# Patient Record
Sex: FEMALE | Race: WHITE | HISPANIC OR LATINO | Employment: FULL TIME | URBAN - METROPOLITAN AREA
[De-identification: names, ages, dates, MRNs, and addresses within clinical notes are randomized per-mention and may not be internally consistent; named-entity substitution may affect disease eponyms.]

---

## 2017-02-18 ENCOUNTER — HOSPITAL ENCOUNTER (OUTPATIENT)
Dept: LAB | Facility: MEDICAL CENTER | Age: 47
End: 2017-02-18
Attending: INTERNAL MEDICINE
Payer: COMMERCIAL

## 2017-02-18 LAB
25(OH)D3 SERPL-MCNC: 24 NG/ML (ref 30–100)
T4 FREE SERPL-MCNC: 0.93 NG/DL (ref 0.53–1.43)
TSH SERPL DL<=0.005 MIU/L-ACNC: 0.63 UIU/ML (ref 0.3–3.7)
VIT B12 SERPL-MCNC: 419 PG/ML (ref 211–911)

## 2017-02-18 PROCEDURE — 36415 COLL VENOUS BLD VENIPUNCTURE: CPT

## 2017-02-18 PROCEDURE — 82306 VITAMIN D 25 HYDROXY: CPT

## 2017-02-18 PROCEDURE — 84439 ASSAY OF FREE THYROXINE: CPT

## 2017-02-18 PROCEDURE — 82607 VITAMIN B-12: CPT

## 2017-02-18 PROCEDURE — 84443 ASSAY THYROID STIM HORMONE: CPT

## 2017-03-24 ENCOUNTER — OFFICE VISIT (OUTPATIENT)
Dept: URGENT CARE | Facility: PHYSICIAN GROUP | Age: 47
End: 2017-03-24
Payer: COMMERCIAL

## 2017-03-24 VITALS
SYSTOLIC BLOOD PRESSURE: 120 MMHG | BODY MASS INDEX: 23.55 KG/M2 | HEIGHT: 62 IN | WEIGHT: 128 LBS | HEART RATE: 74 BPM | TEMPERATURE: 97.3 F | RESPIRATION RATE: 12 BRPM | OXYGEN SATURATION: 99 % | DIASTOLIC BLOOD PRESSURE: 60 MMHG

## 2017-03-24 DIAGNOSIS — K12.30 STOMATITIS AND MUCOSITIS: ICD-10-CM

## 2017-03-24 DIAGNOSIS — B34.9 ACUTE BRONCHOSPASM DUE TO VIRAL INFECTION: ICD-10-CM

## 2017-03-24 DIAGNOSIS — K12.1 STOMATITIS AND MUCOSITIS: ICD-10-CM

## 2017-03-24 DIAGNOSIS — J98.01 ACUTE BRONCHOSPASM DUE TO VIRAL INFECTION: ICD-10-CM

## 2017-03-24 DIAGNOSIS — J06.9 VIRAL UPPER RESPIRATORY TRACT INFECTION: ICD-10-CM

## 2017-03-24 PROCEDURE — 99204 OFFICE O/P NEW MOD 45 MIN: CPT | Performed by: EMERGENCY MEDICINE

## 2017-03-24 ASSESSMENT — ENCOUNTER SYMPTOMS
DIARRHEA: 0
SORE THROAT: 0
RHINORRHEA: 1
HEADACHES: 0
HEARTBURN: 0
WHEEZING: 1
SPUTUM PRODUCTION: 1
SINUS PAIN: 1
ABDOMINAL PAIN: 0
CHILLS: 1
VOMITING: 0
COUGH: 1
FEVER: 0
NAUSEA: 0
EYE REDNESS: 0
HEMOPTYSIS: 0
MYALGIAS: 0
EYE DISCHARGE: 0
SHORTNESS OF BREATH: 0

## 2017-03-24 NOTE — MR AVS SNAPSHOT
"        Mirtha Han   3/24/2017 8:35 AM   Office Visit   MRN: [id number]    Department:  [facility]   Dept Phone:  [phone]    Description:  Female : 1970   Provider:  Geovany Waldron M.D.           Reason for Visit     Cough productive flem, headache, both ears, blissters mouth, x3 weeks      Allergies as of 3/24/2017     Allergen Noted Reactions    Nkda [No Known Drug Allergy] 2010         You were diagnosed with     Stomatitis and mucositis   [906819]       Acute bronchospasm due to viral infection   [971899]       Viral upper respiratory tract infection   [504574]         Vital Signs     Blood Pressure Pulse Temperature Respirations Height Weight    120/60 mmHg 74 36.3 °C (97.3 °F) 12 1.575 m (5' 2\") 58.06 kg (128 lb)    Body Mass Index Oxygen Saturation Smoking Status             23.41 kg/m2 99% Never Smoker          Basic Information     Date Of Birth Sex Race Ethnicity Preferred Language    1970 Female  or   Origin (Malay,Citizen of [place], etc) English      Problem List              ICD-10-CM Priority Class Noted - Resolved    Migraine headache G43.909   Unknown - Present    Deviated nasal septum J34.2   3/21/2016 - Present      Health Maintenance        Date Due Completion Dates    IMM DTaP/Tdap/Td Vaccine (1 - Tdap) 10/14/1991 ---    PAP SMEAR 10/14/1993 ---    MAMMOGRAM 2014 (N/S)    Override on 2013: (N/S) ([facility])    IMM INFLUENZA (1) 2016 10/30/2013            Current Immunizations     Influenza TIV (IM) 10/30/2013      Below and/or attached are the medications your provider expects you to take. Review all of your home medications and newly ordered medications with your provider and/or pharmacist. Follow medication instructions as directed by your provider and/or pharmacist. Please keep your medication list with you and share with your provider. Update the information when medications are discontinued, doses are " changed, or new medications (including over-the-counter products) are added; and carry medication information at all times in the event of emergency situations     Allergies:  NKDA - (reactions not documented)               Medications  Valid as of: March 24, 2017 - 11:29 AM    Generic Name Brand Name Tablet Size Instructions for use    Albuterol Sulfate (AEROSOL POWDER, BREATH ACTIVATED) Albuterol Sulfate 108 (90 BASE) MCG/ACT Inhale 1-2 Puffs by mouth every 6 hours as needed (coughing, wheezing).        Amoxicillin (Recon Susp) AMOXIL 400 MG/5ML Take 10 mL by mouth 2 times a day.        Hydrocodone-Acetaminophen (Solution) HYCET 7.5-325 MG/15ML Take 15-30 mL by mouth 4 times a day as needed.        Lidocaine HCl (Solution) XYLOCAINE 2 % Take 15 mL by mouth 4 times a day as needed for Throat/Mouth Pain.        Ondansetron (TABLET DISPERSIBLE) ZOFRAN ODT 4 MG Take 1 Tab by mouth every 6 hours as needed for Nausea/Vomiting.        Propranolol HCl   by Does not apply route. 2 x day for migraines        Sumatriptan-Naproxen Sodium (Tab) Sumatriptan-Naproxen Sodium  MG Take  by mouth. Prn migraines        Topiramate (Tab) TOPAMAX 25 MG Take 25 mg by mouth as needed.        .                 Medicines prescribed today were sent to:     [facility] [address], [place] - [address]    [address] [address]    Phone: [phone] Fax: [phone]    Open 24 Hours?: No    [facility] [id number] - [place], [place] - [address]    [address] [address]    Phone: [phone] Fax: [phone]    Open 24 Hours?: No      Medication refill instructions:       If your prescription bottle indicates you have medication refills left, it is not necessary to call your provider’s office. Please contact your pharmacy and they will refill your medication.    If your prescription bottle indicates you do not have any refills left, you may request refills at any time through one of the  following ways: The online [employer] system (except Urgent Care), by calling your provider’s office, or by asking your pharmacy to contact your provider’s office with a refill request. Medication refills are processed only during regular business hours and may not be available until the next business day. Your provider may request additional information or to have a follow-up visit with you prior to refilling your medication.   *Please Note: Medication refills are assigned a new Rx number when refilled electronically. Your pharmacy may indicate that no refills were authorized even though a new prescription for the same medication is available at the pharmacy. Please request the medicine by name with the pharmacy before contacting your provider for a refill.        Instructions    Use saline nasal irrigation, such as with a Neti Pot, as needed daily for relief of nasal or sinus congestion relief.  Use behind-the-counter pseudoephedrine (Sudafed) oral decongestant as needed; avoid bedtime use.  Use over-the-counter oxymetazoline (Afrin) nasal spray as needed for up to 3 days only; follow package instructions for dosing.  Use over-the-counter pain reliever, such as ibuprofen (Advil, Motrin) or naproxen (Aleve) as needed; follow package directions for dosing.    Stomatitis  Stomatitis is a condition that causes inflammation in your mouth. It can affect a part of your mouth or your whole mouth. The condition often affects your cheek, teeth, gums, lips, and tongue. Stomatitis can also affect the mucous membranes that surround your mouth (mucosa).  Pain from stomatitis can make it hard for you to eat or drink. Severe cases of this condition can lead to dehydration or poor nutrition.  CAUSES  Common causes of this condition include:  · Viruses, such as cold sores or oral herpes and shingles.  · Canker sores.  · Bacterial infections.  · Fungus or yeast infections, such as oral thrush.  · Not getting adequate  nutrition.  · Injury to your mouth. This can be from:  ¨ Dentures or braces that do not fit well.  ¨ Biting your tongue or cheek.  ¨ Burning your mouth.  ¨ Having sharp or broken teeth.  · Gum disease.  · Using tobacco, especially chewing tobacco.  · Allergies to foods, medicines, or substances that are used in your mouth.  · Medicines, including cancer medicines (chemotherapy), antihistamines, and seizure medicines.  In some cases, the cause may not be known.  RISK FACTORS  This condition is more likely to develop in people who:  · Have poor oral hygiene or poor nutrition.  · Have any condition that causes a dry mouth.  · Are under a lot of physical or emotional stress.  · Have any condition that weakens the body's defense system (immune system).  · Are being treated for cancer.  · Smoke.  SYMPTOMS  The most common symptoms of this condition are pain, swelling, and redness inside your mouth. The pain may feel like burning or stinging. It may get worse from eating or drinking. Other symptoms include:  · Painful, shallow sores (ulcers) in the mouth.  · Blisters in the mouth.  · Bleeding gums.  · Swollen gums.  · Irritability and fatigue.  · Bad breath.  · Bad taste in the mouth.  · Fever.  DIAGNOSIS  This condition is diagnosed with a physical exam to check for bleeding gums and mouth ulcers. You may also have other tests, including:  · Blood tests to look for infection or vitamin deficiencies.  · Mouth swab to get a fluid sample to test for bacteria (culture).  · Tissue sample from an ulcer to examine under a microscope (biopsy).  TREATMENT  Treatment for stomatitis depends on the cause. Treatment may include medicines, such as:  · Over-the counter (OTC) pain medicines.  · Topical anesthetic to numb the area if you have severe pain.  · Antibiotics to treat a bacterial infection.  · Antifungals to treat a fungal infection.  · Antivirals to treat a viral infection.  · Mouth rinses that contain steroids to reduce the  swelling in your mouth.  · Other medicines to coat or numb your mouth.  HOME CARE INSTRUCTIONS  Medicines  · Take medicines only as directed by your health care provider.  · If you were prescribed an antibiotic, finish all of it even if you start to feel better.  Lifestyle  · Practice good oral hygiene:  ¨ Gently brush your teeth with a soft, nylon-bristled toothbrush two times each day.  ¨ Floss your teeth every day.  ¨ Have your teeth cleaned regularly, as recommended by your dentist.  · Eat a balanced diet. Do not eat:  ¨ Spicy foods.  ¨ Citrus, such as oranges.  ¨ Foods that have sharp edges, such as chips.  · Avoid any foods or other allergens that you think may be causing your stomatitis.  · If you have dentures, make sure that they are properly fitted.  · Do not use any tobacco products, including cigarettes, chewing tobacco, or electronic cigarettes. If you need help quitting, ask your health care provider.  · Find ways to reduce stress. Try yoga or meditation. Ask your health care provider for other ideas.  General Instructions  · Use a salt-water rinse for pain as directed by your health care provider. Mix 1 tsp of salt in 2 cups of water.  · Drink enough fluid to keep your urine clear or pale yellow. This will keep you hydrated.  SEEK MEDICAL CARE IF:  · Your symptoms get worse.  · You develop new symptoms, especially:  ¨ A rash.  ¨ New symptoms that do not involve your mouth area.  · Your symptoms last longer than three weeks.  · Your stomatitis goes away and then returns.  · You have a harder time eating and drinking normally.  · You have increasing fatigue or weakness.  · You lose your appetite or you feel nauseous.  · You have a fever.     This information is not intended to replace advice given to you by your health care provider. Make sure you discuss any questions you have with your health care provider.     Document Released: 10/14/2008 Document Revised: 05/03/2016 Document Reviewed:  12/14/2015  [employer] Interactive Patient Education ©2016 [employer]  Bronchospasm, Adult  A bronchospasm is when the tubes that carry air in and out of your lungs (airways) spasm or tighten. During a bronchospasm it is hard to breathe. This is because the airways get smaller. A bronchospasm can be triggered by:  · Allergies. These may be to animals, pollen, food, or mold.  · Infection. This is a common cause of bronchospasm.  · Exercise.  · Irritants. These include pollution, cigarette smoke, strong odors, aerosol sprays, and paint fumes.  · Weather changes.  · Stress.  · Being emotional.  HOME CARE   · Always have a plan for getting help. Know when to call your doctor and local emergency services (911 in the [place] Know where you can get emergency care.  · Only take medicines as told by your doctor.  · If you were prescribed an inhaler or nebulizer machine, ask your doctor how to use it correctly. Always use a spacer with your inhaler if you were given one.  · Stay calm during an attack. Try to relax and breathe more slowly.  · Control your home environment:  · Change your heating and air conditioning filter at least once a month.  · Limit your use of fireplaces and wood stoves.  · Do not  smoke. Do not  allow smoking in your home.  · Avoid perfumes and fragrances.  · Get rid of pests (such as roaches and mice) and their droppings.  · Throw away plants if you see mold on them.  · Keep your house clean and dust free.  · Replace carpet with wood, tile, or vinyl guillaume. Carpet can trap dander and dust.  · Use allergy-proof pillows, mattress covers, and box spring covers.  · Wash bed sheets and blankets every week in hot water. Dry them in a dryer.  · Use blankets that are made of polyester or cotton.  · Wash hands frequently.  GET HELP IF:  · You have muscle aches.  · You have chest pain.  · The thick spit you spit or cough up (sputum) changes from clear or white to yellow, green, gray, or bloody.  · The thick  spit you spit or cough up gets thicker.  · There are problems that may be related to the medicine you are given such as:  · A rash.  · Itching.  · Swelling.  · Trouble breathing.  GET HELP RIGHT AWAY IF:  · You feel you cannot breathe or catch your breath.  · You cannot stop coughing.  · Your treatment is not helping you breathe better.  · You have very bad chest pain.  MAKE SURE YOU:   · Understand these instructions.  · Will watch your condition.  · Will get help right away if you are not doing well or get worse.     This information is not intended to replace advice given to you by your health care provider. Make sure you discuss any questions you have with your health care provider.     Document Released: 10/15/2010 Document Revised: 01/08/2016 Document Reviewed: 06/10/2014  [employer] Interactive Patient Education ©2016 [employer]            MyChart Status: Patient Declined

## 2017-03-24 NOTE — PROGRESS NOTES
Subjective:      Mirtha Han is a 46 y.o. female who presents with Cough            Oral Pain  This is a recurrent problem. The current episode started yesterday. The problem occurs constantly. The problem has been unchanged. Associated symptoms include chills, congestion and coughing. Pertinent negatives include no abdominal pain, chest pain, fever, headaches, myalgias, nausea, rash, sore throat or vomiting. The symptoms are aggravated by eating.   URI   This is a new problem. Episode onset: over 2 weeks ago. The problem has been gradually improving. There has been no fever. Associated symptoms include congestion, coughing, ear pain, rhinorrhea, sinus pain and wheezing. Pertinent negatives include no abdominal pain, chest pain, diarrhea, headaches, nausea, plugged ear sensation, rash, sneezing, sore throat or vomiting.       Review of Systems   Constitutional: Positive for chills. Negative for fever and malaise/fatigue.   HENT: Positive for congestion, ear pain and rhinorrhea. Negative for ear discharge, hearing loss, nosebleeds, sneezing, sore throat and tinnitus.    Eyes: Negative for discharge and redness.   Respiratory: Positive for cough, sputum production and wheezing. Negative for hemoptysis and shortness of breath.    Cardiovascular: Negative for chest pain.   Gastrointestinal: Negative for heartburn, nausea, vomiting, abdominal pain and diarrhea.   Musculoskeletal: Negative for myalgias.   Skin: Negative for itching and rash.   Neurological: Negative for headaches.     PMH:  has a past medical history of Migraine headache; Pap smear; Stable burst fracture of first lumbar vertebra ([facility]) (5/01); Snoring; and Sleep apnea.  MEDS:   Current outpatient prescriptions:   •  Albuterol Sulfate 108 (90 BASE) MCG/ACT AEROSOL POWDER, BREATH ACTIVATED, Inhale 1-2 Puffs by mouth every 6 hours as needed (coughing, wheezing)., Disp: 1 Each, Rfl: 0  •  lidocaine viscous 2% (XYLOCAINE) 2 % Solution, Take 15 mL  by mouth 4 times a day as needed for Throat/Mouth Pain., Disp: 120 mL, Rfl: 0  •  topiramate (TOPAMAX) 25 MG Tab, Take 25 mg by mouth as needed., Disp: , Rfl:   •  PROPRANOLOL HCL, by Does not apply route. 2 x day for migraines, Disp: , Rfl:   •  amoxicillin (AMOXIL) 400 MG/5ML suspension, Take 10 mL by mouth 2 times a day., Disp: 200 mL, Rfl: 0  •  hydrocodone-acetaminophen 2.5-108 mg/5mL (HYCET) 7.5-325 MG/15ML solution, Take 15-30 mL by mouth 4 times a day as needed., Disp: 500 mL, Rfl: 0  •  ondansetron (ZOFRAN ODT) 4 MG TABLET DISPERSIBLE, Take 1 Tab by mouth every 6 hours as needed for Nausea/Vomiting., Disp: 10 Tab, Rfl: 0  •  Sumatriptan-Naproxen Sodium (TREXIMET)  MG TABS, Take  by mouth. Prn migraines, Disp: , Rfl:   ALLERGIES:   Allergies   Allergen Reactions   • Nkda [No Known Drug Allergy]      SURGHX:   Past Surgical History   Procedure Laterality Date   • Other orthopedic surgery  1989     right foot surgery - bone removed   • Tubal coagulation laparoscopic bilateral  6/9/2010     Performed by AGAPITO SUÁREZ at SURGERY SAME DAY [facility]   • Septoplasty N/A 3/21/2016     Procedure: SEPTOPLASTY;  Surgeon: DELMA Rodriguez M.D.;  Location: SURGERY SAME DAY [facility];  Service:    • Tonsillectomy N/A 3/21/2016     Procedure: TONSILLECTOMY;  Surgeon: DELMA Rodriguez M.D.;  Location: SURGERY SAME DAY [facility];  Service:    • Septal reconstruction N/A 3/21/2016     Procedure: SEPTAL RECONSTRUCTION with  grafts ;  Surgeon: DELMA Rodriguez M.D.;  Location: SURGERY SAME DAY [facility];  Service:    • Turbinoplasty N/A 3/21/2016     Procedure: TURBINOPLASTY;  Surgeon: DELMA Rodriguez M.D.;  Location: SURGERY SAME DAY [facility];  Service:      SOCHX:  reports that she has never smoked. She does not have any smokeless tobacco history on file. She reports that she drinks alcohol. She reports that she does not use illicit drugs.  FH: family history includes Diabetes in her  "father; Hypertension in her mother.       Objective:     /60 mmHg  Pulse 74  Temp(Src) 36.3 °C (97.3 °F)  Resp 12  Ht 1.575 m (5' 2\")  Wt 58.06 kg (128 lb)  BMI 23.41 kg/m2  SpO2 99%     Physical Exam   Constitutional: She appears well-developed and well-nourished. She is cooperative. She does not have a sickly appearance. She does not appear ill. No distress.   HENT:   Head: Normocephalic.   Right Ear: Hearing, tympanic membrane and ear canal normal.   Left Ear: Hearing, tympanic membrane and ear canal normal.   Nose: Mucosal edema present. No rhinorrhea.   Mouth/Throat: Uvula is midline. No oropharyngeal exudate, posterior oropharyngeal edema or posterior oropharyngeal erythema.   Mild lower buccal sulcus erythema, no clear gingivitis. Punctate erythematous lesion, hard palate. No clear vesicles. Tongue unremarkable   Eyes: Conjunctivae are normal.   Neck: Trachea normal. Neck supple.   Cardiovascular: Normal rate, regular rhythm and normal heart sounds.    Pulmonary/Chest: Effort normal. She has no decreased breath sounds. She has no wheezes. She has no rhonchi. She has no rales.   Lymphadenopathy:     She has no cervical adenopathy.   Neurological: She is alert.   Skin: Skin is warm and dry.   Psychiatric: She has a normal mood and affect.               Assessment/Plan:     1. Stomatitis and mucositis  Advised OTC NSAID  - lidocaine viscous 2% (XYLOCAINE) 2 % Solution; Take 15 mL by mouth 4 times a day as needed for Throat/Mouth Pain.  Dispense: 120 mL; Refill: 0    2. Acute bronchospasm due to viral infection  When necessary use  - Albuterol Sulfate 108 (90 BASE) MCG/ACT AEROSOL POWDER, BREATH ACTIVATED; Inhale 1-2 Puffs by mouth every 6 hours as needed (coughing, wheezing).  Dispense: 1 Each; Refill: 0    3. Viral upper respiratory tract infection  Supportive cares since symptoms resolving        "

## 2017-03-24 NOTE — PATIENT INSTRUCTIONS
Use saline nasal irrigation, such as with a Neti Pot, as needed daily for relief of nasal or sinus congestion relief.  Use behind-the-counter pseudoephedrine (Sudafed) oral decongestant as needed; avoid bedtime use.  Use over-the-counter oxymetazoline (Afrin) nasal spray as needed for up to 3 days only; follow package instructions for dosing.  Use over-the-counter pain reliever, such as ibuprofen (Advil, Motrin) or naproxen (Aleve) as needed; follow package directions for dosing.    Stomatitis  Stomatitis is a condition that causes inflammation in your mouth. It can affect a part of your mouth or your whole mouth. The condition often affects your cheek, teeth, gums, lips, and tongue. Stomatitis can also affect the mucous membranes that surround your mouth (mucosa).  Pain from stomatitis can make it hard for you to eat or drink. Severe cases of this condition can lead to dehydration or poor nutrition.  CAUSES  Common causes of this condition include:  · Viruses, such as cold sores or oral herpes and shingles.  · Canker sores.  · Bacterial infections.  · Fungus or yeast infections, such as oral thrush.  · Not getting adequate nutrition.  · Injury to your mouth. This can be from:  ¨ Dentures or braces that do not fit well.  ¨ Biting your tongue or cheek.  ¨ Burning your mouth.  ¨ Having sharp or broken teeth.  · Gum disease.  · Using tobacco, especially chewing tobacco.  · Allergies to foods, medicines, or substances that are used in your mouth.  · Medicines, including cancer medicines (chemotherapy), antihistamines, and seizure medicines.  In some cases, the cause may not be known.  RISK FACTORS  This condition is more likely to develop in people who:  · Have poor oral hygiene or poor nutrition.  · Have any condition that causes a dry mouth.  · Are under a lot of physical or emotional stress.  · Have any condition that weakens the body's defense system (immune system).  · Are being treated for  cancer.  · Smoke.  SYMPTOMS  The most common symptoms of this condition are pain, swelling, and redness inside your mouth. The pain may feel like burning or stinging. It may get worse from eating or drinking. Other symptoms include:  · Painful, shallow sores (ulcers) in the mouth.  · Blisters in the mouth.  · Bleeding gums.  · Swollen gums.  · Irritability and fatigue.  · Bad breath.  · Bad taste in the mouth.  · Fever.  DIAGNOSIS  This condition is diagnosed with a physical exam to check for bleeding gums and mouth ulcers. You may also have other tests, including:  · Blood tests to look for infection or vitamin deficiencies.  · Mouth swab to get a fluid sample to test for bacteria (culture).  · Tissue sample from an ulcer to examine under a microscope (biopsy).  TREATMENT  Treatment for stomatitis depends on the cause. Treatment may include medicines, such as:  · Over-the counter (OTC) pain medicines.  · Topical anesthetic to numb the area if you have severe pain.  · Antibiotics to treat a bacterial infection.  · Antifungals to treat a fungal infection.  · Antivirals to treat a viral infection.  · Mouth rinses that contain steroids to reduce the swelling in your mouth.  · Other medicines to coat or numb your mouth.  HOME CARE INSTRUCTIONS  Medicines  · Take medicines only as directed by your health care provider.  · If you were prescribed an antibiotic, finish all of it even if you start to feel better.  Lifestyle  · Practice good oral hygiene:  ¨ Gently brush your teeth with a soft, nylon-bristled toothbrush two times each day.  ¨ Floss your teeth every day.  ¨ Have your teeth cleaned regularly, as recommended by your dentist.  · Eat a balanced diet. Do not eat:  ¨ Spicy foods.  ¨ Citrus, such as oranges.  ¨ Foods that have sharp edges, such as chips.  · Avoid any foods or other allergens that you think may be causing your stomatitis.  · If you have dentures, make sure that they are properly fitted.  · Do not use  any tobacco products, including cigarettes, chewing tobacco, or electronic cigarettes. If you need help quitting, ask your health care provider.  · Find ways to reduce stress. Try yoga or meditation. Ask your health care provider for other ideas.  General Instructions  · Use a salt-water rinse for pain as directed by your health care provider. Mix 1 tsp of salt in 2 cups of water.  · Drink enough fluid to keep your urine clear or pale yellow. This will keep you hydrated.  SEEK MEDICAL CARE IF:  · Your symptoms get worse.  · You develop new symptoms, especially:  ¨ A rash.  ¨ New symptoms that do not involve your mouth area.  · Your symptoms last longer than three weeks.  · Your stomatitis goes away and then returns.  · You have a harder time eating and drinking normally.  · You have increasing fatigue or weakness.  · You lose your appetite or you feel nauseous.  · You have a fever.     This information is not intended to replace advice given to you by your health care provider. Make sure you discuss any questions you have with your health care provider.     Document Released: 10/14/2008 Document Revised: 05/03/2016 Document Reviewed: 12/14/2015  [employer] Interactive Patient Education ©2016 [employer]  Bronchospasm, Adult  A bronchospasm is when the tubes that carry air in and out of your lungs (airways) spasm or tighten. During a bronchospasm it is hard to breathe. This is because the airways get smaller. A bronchospasm can be triggered by:  · Allergies. These may be to animals, pollen, food, or mold.  · Infection. This is a common cause of bronchospasm.  · Exercise.  · Irritants. These include pollution, cigarette smoke, strong odors, aerosol sprays, and paint fumes.  · Weather changes.  · Stress.  · Being emotional.  HOME CARE   · Always have a plan for getting help. Know when to call your doctor and local emergency services (911 in the [place] Know where you can get emergency care.  · Only take medicines as  told by your doctor.  · If you were prescribed an inhaler or nebulizer machine, ask your doctor how to use it correctly. Always use a spacer with your inhaler if you were given one.  · Stay calm during an attack. Try to relax and breathe more slowly.  · Control your home environment:  · Change your heating and air conditioning filter at least once a month.  · Limit your use of fireplaces and wood stoves.  · Do not  smoke. Do not  allow smoking in your home.  · Avoid perfumes and fragrances.  · Get rid of pests (such as roaches and mice) and their droppings.  · Throw away plants if you see mold on them.  · Keep your house clean and dust free.  · Replace carpet with wood, tile, or vinyl guillaume. Carpet can trap dander and dust.  · Use allergy-proof pillows, mattress covers, and box spring covers.  · Wash bed sheets and blankets every week in hot water. Dry them in a dryer.  · Use blankets that are made of polyester or cotton.  · Wash hands frequently.  GET HELP IF:  · You have muscle aches.  · You have chest pain.  · The thick spit you spit or cough up (sputum) changes from clear or white to yellow, green, gray, or bloody.  · The thick spit you spit or cough up gets thicker.  · There are problems that may be related to the medicine you are given such as:  · A rash.  · Itching.  · Swelling.  · Trouble breathing.  GET HELP RIGHT AWAY IF:  · You feel you cannot breathe or catch your breath.  · You cannot stop coughing.  · Your treatment is not helping you breathe better.  · You have very bad chest pain.  MAKE SURE YOU:   · Understand these instructions.  · Will watch your condition.  · Will get help right away if you are not doing well or get worse.     This information is not intended to replace advice given to you by your health care provider. Make sure you discuss any questions you have with your health care provider.     Document Released: 10/15/2010 Document Revised: 01/08/2016 Document Reviewed:  06/10/2014  Elsevier Interactive Patient Education ©2016 [employer] Inc.

## 2017-06-27 ENCOUNTER — HOSPITAL ENCOUNTER (OUTPATIENT)
Dept: LAB | Facility: MEDICAL CENTER | Age: 47
End: 2017-06-27
Attending: INTERNAL MEDICINE
Payer: COMMERCIAL

## 2017-06-27 LAB
ALBUMIN SERPL BCP-MCNC: 4.2 G/DL (ref 3.2–4.9)
ALBUMIN/GLOB SERPL: 1.4 G/DL
ALP SERPL-CCNC: 73 U/L (ref 30–99)
ALT SERPL-CCNC: 17 U/L (ref 2–50)
ANION GAP SERPL CALC-SCNC: 7 MMOL/L (ref 0–11.9)
AST SERPL-CCNC: 18 U/L (ref 12–45)
BILIRUB SERPL-MCNC: 0.4 MG/DL (ref 0.1–1.5)
BUN SERPL-MCNC: 11 MG/DL (ref 8–22)
CALCIUM SERPL-MCNC: 9.5 MG/DL (ref 8.5–10.5)
CHLORIDE SERPL-SCNC: 106 MMOL/L (ref 96–112)
CO2 SERPL-SCNC: 27 MMOL/L (ref 20–33)
CREAT SERPL-MCNC: 0.72 MG/DL (ref 0.5–1.4)
GFR SERPL CREATININE-BSD FRML MDRD: >60 ML/MIN/1.73 M 2
GLOBULIN SER CALC-MCNC: 3 G/DL (ref 1.9–3.5)
GLUCOSE SERPL-MCNC: 103 MG/DL (ref 65–99)
POTASSIUM SERPL-SCNC: 4.6 MMOL/L (ref 3.6–5.5)
PROT SERPL-MCNC: 7.2 G/DL (ref 6–8.2)
SODIUM SERPL-SCNC: 140 MMOL/L (ref 135–145)

## 2017-06-27 PROCEDURE — 36415 COLL VENOUS BLD VENIPUNCTURE: CPT

## 2017-06-27 PROCEDURE — 80053 COMPREHEN METABOLIC PANEL: CPT

## 2017-07-10 ENCOUNTER — HOSPITAL ENCOUNTER (OUTPATIENT)
Dept: LAB | Facility: MEDICAL CENTER | Age: 47
End: 2017-07-10
Attending: INTERNAL MEDICINE
Payer: COMMERCIAL

## 2017-07-10 ENCOUNTER — HOSPITAL ENCOUNTER (OUTPATIENT)
Dept: RADIOLOGY | Facility: MEDICAL CENTER | Age: 47
End: 2017-07-10
Attending: INTERNAL MEDICINE
Payer: COMMERCIAL

## 2017-07-10 DIAGNOSIS — M25.551 RIGHT HIP PAIN: ICD-10-CM

## 2017-07-10 PROCEDURE — 73502 X-RAY EXAM HIP UNI 2-3 VIEWS: CPT | Mod: RT

## 2017-09-23 ENCOUNTER — HOSPITAL ENCOUNTER (OUTPATIENT)
Dept: RADIOLOGY | Facility: MEDICAL CENTER | Age: 47
End: 2017-09-23
Attending: ORTHOPAEDIC SURGERY
Payer: COMMERCIAL

## 2017-09-23 DIAGNOSIS — M16.11 PRIMARY OSTEOARTHRITIS OF RIGHT HIP: ICD-10-CM

## 2017-09-23 PROCEDURE — 73721 MRI JNT OF LWR EXTRE W/O DYE: CPT | Mod: RT

## 2018-01-20 ENCOUNTER — HOSPITAL ENCOUNTER (OUTPATIENT)
Dept: LAB | Facility: MEDICAL CENTER | Age: 48
End: 2018-01-20
Attending: INTERNAL MEDICINE
Payer: COMMERCIAL

## 2018-01-20 LAB
25(OH)D3 SERPL-MCNC: 49 NG/ML (ref 30–100)
ALBUMIN SERPL BCP-MCNC: 4.7 G/DL (ref 3.2–4.9)
ALBUMIN/GLOB SERPL: 1.8 G/DL
ALP SERPL-CCNC: 72 U/L (ref 30–99)
ALT SERPL-CCNC: 19 U/L (ref 2–50)
ANION GAP SERPL CALC-SCNC: 7 MMOL/L (ref 0–11.9)
AST SERPL-CCNC: 21 U/L (ref 12–45)
BILIRUB SERPL-MCNC: 0.6 MG/DL (ref 0.1–1.5)
BUN SERPL-MCNC: 17 MG/DL (ref 8–22)
CALCIUM SERPL-MCNC: 9.9 MG/DL (ref 8.5–10.5)
CHLORIDE SERPL-SCNC: 108 MMOL/L (ref 96–112)
CHOLEST SERPL-MCNC: 196 MG/DL (ref 100–199)
CO2 SERPL-SCNC: 26 MMOL/L (ref 20–33)
CREAT SERPL-MCNC: 0.73 MG/DL (ref 0.5–1.4)
GLOBULIN SER CALC-MCNC: 2.6 G/DL (ref 1.9–3.5)
GLUCOSE SERPL-MCNC: 91 MG/DL (ref 65–99)
HDLC SERPL-MCNC: 65 MG/DL
LDLC SERPL CALC-MCNC: 120 MG/DL
POTASSIUM SERPL-SCNC: 4.5 MMOL/L (ref 3.6–5.5)
PROT SERPL-MCNC: 7.3 G/DL (ref 6–8.2)
SODIUM SERPL-SCNC: 141 MMOL/L (ref 135–145)
TRIGL SERPL-MCNC: 56 MG/DL (ref 0–149)

## 2018-01-20 PROCEDURE — 82306 VITAMIN D 25 HYDROXY: CPT

## 2018-01-20 PROCEDURE — 36415 COLL VENOUS BLD VENIPUNCTURE: CPT

## 2018-01-20 PROCEDURE — 80061 LIPID PANEL: CPT

## 2018-01-20 PROCEDURE — 80053 COMPREHEN METABOLIC PANEL: CPT

## 2018-03-21 ENCOUNTER — HOSPITAL ENCOUNTER (EMERGENCY)
Facility: MEDICAL CENTER | Age: 48
End: 2018-03-21
Attending: EMERGENCY MEDICINE
Payer: COMMERCIAL

## 2018-03-21 VITALS
DIASTOLIC BLOOD PRESSURE: 58 MMHG | BODY MASS INDEX: 20.9 KG/M2 | HEIGHT: 66 IN | HEART RATE: 74 BPM | RESPIRATION RATE: 16 BRPM | OXYGEN SATURATION: 97 % | SYSTOLIC BLOOD PRESSURE: 90 MMHG | WEIGHT: 130.07 LBS

## 2018-03-21 DIAGNOSIS — F10.10 ETOH ABUSE: ICD-10-CM

## 2018-03-21 LAB
AMPHET UR QL SCN: NEGATIVE
BARBITURATES UR QL SCN: NEGATIVE
BENZODIAZ UR QL SCN: NEGATIVE
BZE UR QL SCN: NEGATIVE
CANNABINOIDS UR QL SCN: NEGATIVE
METHADONE UR QL SCN: NEGATIVE
OPIATES UR QL SCN: NEGATIVE
OXYCODONE UR QL SCN: NEGATIVE
PCP UR QL SCN: NEGATIVE
POC BREATHALIZER: 0.19 PERCENT (ref 0–0.01)
PROPOXYPH UR QL SCN: NEGATIVE

## 2018-03-21 PROCEDURE — 99285 EMERGENCY DEPT VISIT HI MDM: CPT

## 2018-03-21 PROCEDURE — 302970 POC BREATHALIZER: Performed by: EMERGENCY MEDICINE

## 2018-03-21 PROCEDURE — 80307 DRUG TEST PRSMV CHEM ANLYZR: CPT

## 2018-03-21 PROCEDURE — 302970 POC BREATHALIZER

## 2018-03-21 ASSESSMENT — PAIN SCALES - GENERAL: PAINLEVEL_OUTOF10: 0

## 2018-03-21 ASSESSMENT — LIFESTYLE VARIABLES: DO YOU DRINK ALCOHOL: NO

## 2018-03-22 NOTE — ED NOTES
Pt vomited approx 300 ML of food/fluid; requesting water to drink; given ice chips and instructed to eat one slowly at a time to prevent from causing further vomiting.

## 2018-03-22 NOTE — ED NOTES
Discharge instructions given to patient and family; both verbalized understanding. VSS for baseline prior to discharge. Patient seen/cleared for discharge by [facility] and ERP. Pt ambulatory w/ steady gait upon leaving ED w/ adult daughter who will drive patient home.

## 2018-03-22 NOTE — ED PROVIDER NOTES
"ED Provider Note    CHIEF COMPLAINT  Chief Complaint   Patient presents with   • Alcohol Intoxication     pt biba from home after patient drank 1 bottle of wine and became emotional. [place] Chris called to house to help calm pt down .   • Suicidal Ideation     pt stated to fire/ems that she thinks her 17 year old daughter is doing drugs which made her emotional and drink a bottle of wine tonight        HPI  Mirtha Han is a 47 y.o. female here for evaluation after she drank 1.5 bottles of red wine, and states \"i feel like a mother who has failed.'  She states this because her daughter smokes weed.  The pt herself has no medical concerns, no cp, no sob, no abdominal pain, and no headache.   She states \"i shouldn't live anymore.'   She denies any attempt in the past, and denies taking any pills today.  Admits to only etoh.  She has no reports of homicidal ideation.     PAST MEDICAL HISTORY   has a past medical history of Migraine headache; Pap smear; Sleep apnea; Snoring; and Stable burst fracture of first lumbar vertebra (CMS-HCC) (5/01).    SOCIAL HISTORY  Social History     Social History Main Topics   • Smoking status: Never Smoker   • Smokeless tobacco: Not on file   • Alcohol use Yes      Comment: 4 per month   • Drug use: No   • Sexual activity: Yes     Birth control/ protection: Pill       SURGICAL HISTORY   has a past surgical history that includes other orthopedic surgery (1989); tubal coagulation laparoscopic bilateral (6/9/2010); septoplasty (N/A, 3/21/2016); tonsillectomy (N/A, 3/21/2016); septal reconstruction (N/A, 3/21/2016); and turbinoplasty (N/A, 3/21/2016).    CURRENT MEDICATIONS  Home Medications    **Home medications have not yet been reviewed for this encounter**         ALLERGIES  Allergies   Allergen Reactions   • Nkda [No Known Drug Allergy]        REVIEW OF SYSTEMS  See HPI for further details. Review of systems as above, otherwise all other systems are negative.     PHYSICAL " "EXAM  VITAL SIGNS: /78   Pulse 83   Resp 18   Ht 1.676 m (5' 6\")   Wt 59 kg (130 lb 1.1 oz)   SpO2 98%   BMI 20.99 kg/m²     Constitutional: Well developed, well nourished. mild acute distress.  HEENT: Normocephalic, atraumatic. MMM  Neck: Supple, Full range of motion   Chest/Pulmonary:  No respiratory distress.  Equal expansion   Musculoskeletal: No deformity, no edema, neurovascular intact.   Neuro: slurred speech, cooperative, cranial nerves II-XII grossly intact.  Psych:  Tearful, depressed,      PROCEDURES     MEDICAL RECORD  I have reviewed patient's medical record and pertinent results are listed above.    COURSE & MEDICAL DECISION MAKING  I have reviewed any medical record information, laboratory studies and radiographic results as noted above.    I you have had any blood pressure issues while here in the emergency department, please see your doctor for a further evaluation or work up.    The pt will be evaluated when sober, by the oncoming erp.      Differential diagnoses include but not limited to: etoh abuse, si/hi    This patient presents with etoh abuse .  At this time, I have counseled the patient/family regarding their medications, pain control, and follow up.  They will continue their medications, if any, as prescribed.  They will return immediately for any worsening symptoms and/or any other medical concerns.  They will see their doctor, or contact the doctor provided, in 1-2 days for follow up.       FINAL IMPRESSION  etoh abuse      Electronically signed by: Kaushal Duong, 3/21/2018 7:12 PM      "

## 2018-03-22 NOTE — ED PROVIDER NOTES
ED Provider Note    The patient was reexamined. Her family is with her and she has a good support network. The patient assures me that she is not suicidal. She contracts for safety. She will follow up as per life skills.

## 2018-03-22 NOTE — DISCHARGE INSTRUCTIONS
Alcohol Problems  Most adults who drink alcohol drink in moderation (not a lot) are at low risk for developing problems related to their drinking. However, all drinkers, including low-risk drinkers, should know about the health risks connected with drinking alcohol.  RECOMMENDATIONS FOR LOW-RISK DRINKING   Drink in moderation. Moderate drinking is defined as follows:   · Men - no more than 2 drinks per day.  · Nonpregnant women - no more than 1 drink per day.  · Over age 65 - no more than 1 drink per day.  A standard drink is 12 grams of pure alcohol, which is equal to a 12 ounce bottle of beer or wine cooler, a 5 ounce glass of wine, or 1.5 ounces of distilled spirits (such as whiskey, roseanna, vodka, or rum).   ABSTAIN FROM (DO NOT DRINK) ALCOHOL:  · When pregnant or considering pregnancy.  · When taking a medication that interacts with alcohol.  · If you are alcohol dependent.  · A medical condition that prohibits drinking alcohol (such as ulcer, liver disease, or heart disease).  DISCUSS WITH YOUR CAREGIVER:  · If you are at risk for coronary heart disease, discuss the potential benefits and risks of alcohol use: Light to moderate drinking is associated with lower rates of coronary heart disease in certain populations (for example, men over age 45 and postmenopausal women). Infrequent or nondrinkers are advised not to begin light to moderate drinking to reduce the risk of coronary heart disease so as to avoid creating an alcohol-related problem. Similar protective effects can likely be gained through proper diet and exercise.  · Women and the elderly have smaller amounts of body water than men. As a result women and the elderly achieve a higher blood alcohol concentration after drinking the same amount of alcohol.  · Exposing a fetus to alcohol can cause a broad range of birth defects referred to as Fetal Alcohol Syndrome (FAS) or Alcohol-Related Birth Defects (ARBD). Although FAS/ARBD is connected with excessive  alcohol consumption during pregnancy, studies also have reported neurobehavioral problems in infants born to mothers reporting drinking an average of 1 drink per day during pregnancy.  · Heavier drinking (the consumption of more than 4 drinks per occasion by men and more than 3 drinks per occasion by women) impairs learning (cognitive) and psychomotor functions and increases the risk of alcohol-related problems, including accidents and injuries.  CAGE QUESTIONS:   · Have you ever felt that you should Cut down on your drinking?  · Have people Annoyed you by criticizing your drinking?  · Have you ever felt bad or Guilty about your drinking?  · Have you ever had a drink first thing in the morning to steady your nerves or get rid of a hangover (Eye opener)?  If you answered positively to any of these questions: You may be at risk for alcohol-related problems if alcohol consumption is:   · Men: Greater than 14 drinks per week or more than 4 drinks per occasion.  · Women: Greater than 7 drinks per week or more than 3 drinks per occasion.  Do you or your family have a medical history of alcohol-related problems, such as:  · Blackouts.  · Sexual dysfunction.  · Depression.  · Trauma.  · Liver dysfunction.  · Sleep disorders.  · Hypertension.  · Chronic abdominal pain.  · Has your drinking ever caused you problems, such as problems with your family, problems with your work (or school) performance, or accidents/injuries?  · Do you have a compulsion to drink or a preoccupation with drinking?  · Do you have poor control or are you unable to stop drinking once you have started?  · Do you have to drink to avoid withdrawal symptoms?  · Do you have problems with withdrawal such as tremors, nausea, sweats, or mood disturbances?  · Does it take more alcohol than in the past to get you high?  · Do you feel a strong urge to drink?  · Do you change your plans so that you can have a drink?  · Do you ever drink in the morning to relieve  the shakes or a hangover?  If you have answered a number of the previous questions positively, it may be time for you to talk to your caregivers, family, and friends and see if they think you have a problem. Alcoholism is a chemical dependency that keeps getting worse and will eventually destroy your health and relationships. Many alcoholics end up dead, impoverished, or in [facility]. This is often the end result of all chemical dependency.  · Do not be discouraged if you are not ready to take action immediately.  · Decisions to change behavior often involve up and down desires to change and feeling like you cannot decide.  · Try to think more seriously about your drinking behavior.  · Think of the reasons to quit.  WHERE TO GO FOR ADDITIONAL INFORMATION   · The National [place] on Alcohol Abuse and Alcoholism (NIAAA)  www.niaaa.nih.gov  · National [place] on Alcoholism and Drug Dependence (NCADD)  www.ncadd.org  · American Society of Addiction Medicine (ASAM)  www.asam.org   Document Released: 12/18/2006 Document Revised: 03/11/2013 Document Reviewed: 08/05/2009  [employer]® Patient Information ©2014 [employer].

## 2018-03-22 NOTE — ED TRIAGE NOTES
Chief Complaint   Patient presents with   • Alcohol Intoxication     pt biba from home after patient drank 1 bottle of wine and became emotional. [place] Chris called to house to help calm pt down .   • Suicidal Ideation     pt stated to fire/ems that she thinks her 17 year old daughter is doing drugs which made her emotional and drink a bottle of wine tonight

## 2018-03-22 NOTE — CONSULTS
[facility]   TRIAGE ASSESSMENT    Name: Mirtha Han  MRN: [id number]  : 1970  Age: 47 y.o.  Date of assessment: 3/21/2018  PCP: Marco Antonio Tolbert M.D.  Persons in attendance: Patient    CHIEF COMPLAINT/PRESENTING ISSUE (as stated by ): States she has been very upset about her daughter smoking pot.  States their relationship has changed since she started smoking pot so she drank more than a bottle of wine.  States she said she just wanted to die because she can't make her daughter stop using drugs. Denies having a plan or ever trying to kill herself in the past.  Denies drinking on a regular basis and she did not react well to drinking the wine.  States she just wants to help her daughter.  Denies S/I.  Chief Complaint   Patient presents with   • Alcohol Intoxication     pt biba from home after patient drank 1 bottle of wine and became emotional. [place] Chris called to house to help calm pt down .   • Suicidal Ideation     pt stated to fire/ems that she thinks her 17 year old daughter is doing drugs which made her emotional and drink a bottle of wine tonight         CURRENT LIVING SITUATION/SOCIAL SUPPORT: LIves with her  and 17 year old daughter.  She gets along well with her  and he is better at talking to her daughter.    BEHAVIORAL HEALTH TREATMENT HISTORY  Does patient/parent report a history of prior behavioral health treatment for patient?   Yes:    Dates Level of Care Facilty/Provider Diagnosis/Problem Medications    OP Bryanna Hernandez  marital                                                                         SAFETY ASSESSMENT - SELF  Does patient acknowledge current or past symptoms of dangerousness to self? no  Does parent/significant other report patient has current or past symptoms of dangerousness to self? no  Does presenting problem suggest symptoms of dangerousness to self? No    SAFETY ASSESSMENT - OTHERS  Does patient acknowledge current or past  "symptoms of aggressive behavior or risk to others? no  Does parent/significant other report patient has current or past symptoms of aggressive behavior or risk to others?  no  Does presenting problem suggest symptoms of dangerousness to others? No    Crisis Safety Plan completed and copy given to patient? no    ABUSE/NEGLECT SCREENING  Does patient report feeling “unsafe” in his/her home, or afraid of anyone?  no  Does patient report any history of physical, sexual, or emotional abuse?  no  Does parent or significant other report any of the above? no  Is there evidence of neglect by self?  no  Is there evidence of neglect by a caregiver? no  Does the patient/parent report any history of CPS/APS/police involvement related to suspected abuse/neglect or domestic violence? no  Based on the information provided during the current assessment, is a mandated report of suspected abuse/neglect being made?  No    SUBSTANCE USE SCREENING  Yes:  [place] all substances used in the past 30 days:      Last Use Amount   [x]   Alcohol 3/21/18    []   Marijuana     []   Heroin     []   Prescription Opioids  (used without prescription, for    recreation, or in excess of prescribed amount)     []   Other Prescription  (used without prescription, for    recreation, or in excess of prescribed amount)     []   Cocaine      []   Methamphetamine     []   \"\" drugs (ectasy, MDMA)     []   Other substances        UDS results: neg  Breathalyzer results: 0.14    What consequences does the patient associate with any of the above substance use and or addictive behaviors? None    Risk factors for detox (check all that apply):  []  Seizures   []  Diaphoretic (sweating)   []  Tremors   []  Hallucinations   []  Increased blood pressure   []  Decreased blood pressure   []  Other   [x]  None      [] Patient education on risk factors for detoxification and instructed to return to ER as needed.      MENTAL STATUS   Participation: Active verbal " participation, Attentive, Engaged and Open to feedback  Grooming: Casual and Neat  Orientation: Alert and Fully Oriented  Behavior: Calm  Eye contact: Good  Mood: Depressed  Affect: Flexible and Full range  Thought process: Logical  Thought content: Within normal limits  Speech: Rate within normal limits and Volume within normal limits  Perception: Within normal limits  Memory:  Recent:  Adequate  Insight: Adequate  Judgment:  Adequate  Other:    Collateral information: daughter stated that her mother never drinks usually.    Source:  [x] Significant other present in person:   [] Significant other by telephone  [] [facility]   [] [facility] Nursing Staff  [x] [facility] Record  [] Other:     [] Unable to complete full assessment due to:  [] Acute intoxication  [] Patient declined to participate/engage  [] Patient verbally unresponsive  [] Significant cognitive deficits  [] Significant perceptual distortions or behavioral disorganization  [] Other:      CLINICAL IMPRESSIONS:  Primary:  Alcohol intoxication  Secondary:  Family discord.         IDENTIFIED NEEDS/PLAN:  [Trigger DISPOSITION list for any items marked]    []  Imminent safety risk - self [] Imminent safety risk - others   []  Acute substance withdrawal []  Psychosis/Impaired reality testing   []  Mood/anxiety []  Substance use/Addictive behavior   []  Maladaptive behaviro []  Parent/child conflict   [x]  Family/Couples conflict []  Biomedical   []  Housing []  Financial   []   Legal  Occupational/Educational   []  Domestic violence []  Other:     Disposition: Discharge home with supportive family    Does patient express agreement with the above plan? yes    Referral appointment(s) scheduled? no    Alert team only:   I have discussed findings and recommendations with Dr. Celeste who is in agreement with these recommendations.     Referral information sent to the following community providers :          Justine Sanchez R.N.  3/21/2018               DISPLAY PLAN FREE TEXT

## 2018-06-23 ENCOUNTER — HOSPITAL ENCOUNTER (OUTPATIENT)
Dept: LAB | Facility: MEDICAL CENTER | Age: 48
End: 2018-06-23
Attending: INTERNAL MEDICINE
Payer: COMMERCIAL

## 2018-06-23 LAB
BASOPHILS # BLD AUTO: 1.2 % (ref 0–1.8)
BASOPHILS # BLD: 0.06 K/UL (ref 0–0.12)
EOSINOPHIL # BLD AUTO: 0.07 K/UL (ref 0–0.51)
EOSINOPHIL NFR BLD: 1.4 % (ref 0–6.9)
ERYTHROCYTE [DISTWIDTH] IN BLOOD BY AUTOMATED COUNT: 41.4 FL (ref 35.9–50)
EST. AVERAGE GLUCOSE BLD GHB EST-MCNC: 126 MG/DL
HBA1C MFR BLD: 6 % (ref 0–5.6)
HCT VFR BLD AUTO: 43.9 % (ref 37–47)
HGB BLD-MCNC: 14.5 G/DL (ref 12–16)
IMM GRANULOCYTES # BLD AUTO: 0.01 K/UL (ref 0–0.11)
IMM GRANULOCYTES NFR BLD AUTO: 0.2 % (ref 0–0.9)
LYMPHOCYTES # BLD AUTO: 1.81 K/UL (ref 1–4.8)
LYMPHOCYTES NFR BLD: 36.4 % (ref 22–41)
MCH RBC QN AUTO: 29.1 PG (ref 27–33)
MCHC RBC AUTO-ENTMCNC: 33 G/DL (ref 33.6–35)
MCV RBC AUTO: 88.2 FL (ref 81.4–97.8)
MONOCYTES # BLD AUTO: 0.3 K/UL (ref 0–0.85)
MONOCYTES NFR BLD AUTO: 6 % (ref 0–13.4)
NEUTROPHILS # BLD AUTO: 2.72 K/UL (ref 2–7.15)
NEUTROPHILS NFR BLD: 54.8 % (ref 44–72)
NRBC # BLD AUTO: 0 K/UL
NRBC BLD-RTO: 0 /100 WBC
PLATELET # BLD AUTO: 247 K/UL (ref 164–446)
PMV BLD AUTO: 11.5 FL (ref 9–12.9)
RBC # BLD AUTO: 4.98 M/UL (ref 4.2–5.4)
T4 FREE SERPL-MCNC: 0.75 NG/DL (ref 0.53–1.43)
TSH SERPL DL<=0.005 MIU/L-ACNC: 0.85 UIU/ML (ref 0.38–5.33)
WBC # BLD AUTO: 5 K/UL (ref 4.8–10.8)

## 2018-06-23 PROCEDURE — 85025 COMPLETE CBC W/AUTO DIFF WBC: CPT

## 2018-06-23 PROCEDURE — 83036 HEMOGLOBIN GLYCOSYLATED A1C: CPT

## 2018-06-23 PROCEDURE — 36415 COLL VENOUS BLD VENIPUNCTURE: CPT

## 2018-06-23 PROCEDURE — 84439 ASSAY OF FREE THYROXINE: CPT

## 2018-06-23 PROCEDURE — 84443 ASSAY THYROID STIM HORMONE: CPT

## 2018-09-12 ENCOUNTER — SLEEP CENTER VISIT (OUTPATIENT)
Dept: SLEEP MEDICINE | Facility: MEDICAL CENTER | Age: 48
End: 2018-09-12
Payer: COMMERCIAL

## 2018-09-12 VITALS
RESPIRATION RATE: 15 BRPM | HEIGHT: 61 IN | SYSTOLIC BLOOD PRESSURE: 112 MMHG | BODY MASS INDEX: 26.62 KG/M2 | DIASTOLIC BLOOD PRESSURE: 70 MMHG | WEIGHT: 141 LBS | HEART RATE: 86 BPM | OXYGEN SATURATION: 94 %

## 2018-09-12 DIAGNOSIS — G47.33 OSA (OBSTRUCTIVE SLEEP APNEA): ICD-10-CM

## 2018-09-12 PROCEDURE — 99203 OFFICE O/P NEW LOW 30 MIN: CPT | Performed by: FAMILY MEDICINE

## 2018-09-12 NOTE — PROGRESS NOTES
"     [facility]  Consult Note     Date: 9/12/2018 / Time: 2:03 PM    Patient ID:   Name:             Mirtha Han   YOB: 1970  Age:                 47 y.o.  female   MRN:               [id number]      Thank you for requesting a sleep medicine consultation on Mirtha Han at the sleep center. She presents today with the chief complaints of WILFREDO and to establish as a new pt. She was previously seen by . Pt was diagnosed with WILFREDO on 2/7/14 via split night study. She had sever WILFREDO with AHI of 35.2/hr and O2 nydia 83%. Best tolerated pressure was CPAp 7 cm with improved AHI of 2.2/hr and O2 nydia 95%. She has been on CPAP 11 cm since then.The initial presenting symptoms were snoring and occasional excessive daytime sleepiness (EDS) . She is also S/P UPPP surgery. She is referred by Dr. Tolbert for evaluation and treatment of sleep disorder breathing .     HISTORY OF PRESENT ILLNESS:       At night,  Mirtha Han goes to bed around 10 pm on weekdays and on weekends. She gets out of bed at 4:30 am on weekdays and at 7 am on weekends.  She  averages 6 hrs of sleep on a good night and 3-4 hrs on a bad night. Pt has bad nights 1 nights per month. She falls asleep within 10 minutes. She awakens 1 times a night due to bathroom use. It takes her 10 min to fall back asleep.     She is not aware of snoring/witnessed apneas/gasping or choking in sleep since she has been on CPAP. However she has not been using CPAP due to dry eyes. She  denies any symptoms of restless legs syndrome such as an \"urge to move\"  She  legs in the evening or bedtime. She  denies any symptoms of narcolepsy such as sleep paralysis or cataplexy, or any symptoms to suggest parasomnias such as sleep walking or acting out of dreams. She  has not used any medications for her sleep problem.    Overall,  She does not finds her sleep refreshing. When She  wakes up in the morning, She does feel tired. In terms " of  excessive daytime sleepiness,  She complains of sleepiness while  at work, while reading or watching TV and occasionally while driving. Leslie sleepiness scale score is abnormal at  12/24.   She does not take regular naps.      The 30 day compliance was downloaded which shows inadequate compliance with more that 4 hr usage about 0%. The AHI is has improved to 2/hr. The mask leak is normal.       REVIEW OF SYSTEMS:       Constitutional: Denies fevers, Denies weight changes  Eyes: Denies changes in vision,+ dry eyes   Ears/Nose/Throat/Mouth: Denies nasal congestion or sore throat   Cardiovascular: Denies chest pain or palpitations   Respiratory: Denies shortness of breath , Denies cough  Gastrointestinal/Hepatic: Denies abdominal pain, nausea, vomiting, diarrhea, constipation or GI bleeding   Genitourinary: Denies bladder dysfunction, dysuria or frequency  Musculoskeletal/Rheum: Denies  joint pain and swelling   Neurological: Denies headache, confusion, memory loss or focal weakness/parasthesias  Psychiatric: denies mood disorder     Comprehensive review of systems form is reviewed with the patient and is attached in the EMR.     PMH:  has a past medical history of Chickenpox; Migraine headache; Pap smear; Sleep apnea; Snoring; and Stable burst fracture of first lumbar vertebra (HCC) (5/01).  MEDS:   Current Outpatient Prescriptions:   •  Cholecalciferol (VITAMIN D PO), Take  by mouth., Disp: , Rfl:   •  Albuterol Sulfate 108 (90 BASE) MCG/ACT AEROSOL POWDER, BREATH ACTIVATED, Inhale 1-2 Puffs by mouth every 6 hours as needed (coughing, wheezing)., Disp: 1 Each, Rfl: 0  •  topiramate (TOPAMAX) 25 MG Tab, Take 25 mg by mouth as needed., Disp: , Rfl:   •  PROPRANOLOL HCL, by Does not apply route. 2 x day for migraines, Disp: , Rfl:   •  Sumatriptan-Naproxen Sodium (TREXIMET)  MG TABS, Take  by mouth. Prn migraines, Disp: , Rfl:   •  lidocaine viscous 2% (XYLOCAINE) 2 % Solution, Take 15 mL by mouth 4 times a  "day as needed for Throat/Mouth Pain., Disp: 120 mL, Rfl: 0  •  amoxicillin (AMOXIL) 400 MG/5ML suspension, Take 10 mL by mouth 2 times a day., Disp: 200 mL, Rfl: 0  •  hydrocodone-acetaminophen 2.5-108 mg/5mL (HYCET) 7.5-325 MG/15ML solution, Take 15-30 mL by mouth 4 times a day as needed., Disp: 500 mL, Rfl: 0  •  ondansetron (ZOFRAN ODT) 4 MG TABLET DISPERSIBLE, Take 1 Tab by mouth every 6 hours as needed for Nausea/Vomiting., Disp: 10 Tab, Rfl: 0  ALLERGIES:   Allergies   Allergen Reactions   • Nkda [No Known Drug Allergy]      SURGHX:   Past Surgical History:   Procedure Laterality Date   • SEPTOPLASTY N/A 3/21/2016    Procedure: SEPTOPLASTY;  Surgeon: DELMA Rodriguez M.D.;  Location: SURGERY SAME DAY [facility];  Service:    • TONSILLECTOMY N/A 3/21/2016    Procedure: TONSILLECTOMY;  Surgeon: DELMA Rodriguez M.D.;  Location: SURGERY SAME DAY [facility];  Service:    • SEPTAL RECONSTRUCTION N/A 3/21/2016    Procedure: SEPTAL RECONSTRUCTION with  grafts ;  Surgeon: DELMA Rodriguez M.D.;  Location: SURGERY SAME DAY [facility];  Service:    • TURBINOPLASTY N/A 3/21/2016    Procedure: TURBINOPLASTY;  Surgeon: DELMA Rodriguez M.D.;  Location: SURGERY SAME DAY [facility];  Service:    • TUBAL COAGULATION LAPAROSCOPIC BILATERAL  6/9/2010    Performed by AGAPITO SUÁREZ at SURGERY SAME DAY [facility]S   • OTHER ORTHOPEDIC SURGERY  1989    right foot surgery - bone removed     SOCHX:  reports that she has never smoked. She has never used smokeless tobacco. She reports that she drinks alcohol. She reports that she does not use drugs..   FH:   Family History   Problem Relation Age of Onset   • Diabetes Father    • Hypertension Mother    • Sleep Apnea Neg Hx            Physical Exam:  Vitals/ General Appearance:   Weight/BMI: Body mass index is 26.64 kg/m².  Blood pressure 112/70, pulse 86, resp. rate 15, height 1.549 m (5' 1\"), weight 64 kg (141 lb), SpO2 94 %.  Vitals:    09/12/18 1400   BP: " "112/70   Pulse: 86   Resp: 15   SpO2: 94%   Weight: 64 kg (141 lb)   Height: 1.549 m (5' 1\")       Pt. is alert and oriented to time, place and person. Cooperative and in no apparent distress.       1. Head: Atraumatic, normocephalic.   2. Ears: Normal tympanic membrane and no discharge  3. Nose: No inferior turbinate hypertophy, no septal deviation, no polyp.   4. Throat: Oropharynx appears crowded/adequate in that the palate is not overhanging (Malam Coleen scale 2. Tonsils are not enlarged, s/p uvelectomy, pharynx not inflamed. Tongue is large. has intact dentition and oral hygiene is fair.  5. Neck: Supple. No thyromegaly  6. Chest: Trachea central  7. Lungs auscultation: B/L good air entry, vesicular breath sounds, no adventitious sounds  8. Heart auscultation: 1st and 2nd heart sounds normal, regular rhythm. No appreciable murmur.  9.  Extremities:  no clubbing, no pedal edema.   Peripheral pulses felt.  11. Skin: No rash  12. NEUROLOGICAL EXAMINATION: On neurological exam, the patient was alert and oriented x3. speech was clear and fluent without dysarthriaINVESTIGATIONS:     ASSESSMENT AND PLAN     1. Sleep Apnea (WILFREDO).She  Is currently on CPAP of 11 with FFM. 30 day compliance was downloaded which shows inadequate compliance. The symptoms of excessive daytime has relapsed.       The pathophysiology of sleep anea and the increased risk of cardiovascular morbidity from untreated sleep apnea is discussed in detail with the patient.     She is urged to avoid supine sleep, weight gain and alcoholic beverages since all of these can worsen sleep apnea. She is cautioned against drowsy driving. If She feels sleepy while driving, She must pull over for a break/nap, rather than persist on the road, in the interest of She own safety and that of others on the road.   Plan   - Continue CPAP at 11 cm    - mask fitiing was done and small dreamwear FFM  With med headgear Was given to try    - compliance download and previous " records from Dr. Bella's office was reviewed and discussed with the pt   - compliance was reinforced     2.  Regarding treatment of other past medical problems and general health maintenance,  She is urged to follow up with PCP.      .

## 2018-12-20 ENCOUNTER — SLEEP CENTER VISIT (OUTPATIENT)
Dept: SLEEP MEDICINE | Facility: MEDICAL CENTER | Age: 48
End: 2018-12-20
Payer: COMMERCIAL

## 2018-12-20 VITALS
DIASTOLIC BLOOD PRESSURE: 82 MMHG | OXYGEN SATURATION: 95 % | WEIGHT: 144 LBS | HEART RATE: 74 BPM | BODY MASS INDEX: 27.19 KG/M2 | RESPIRATION RATE: 15 BRPM | HEIGHT: 61 IN | SYSTOLIC BLOOD PRESSURE: 122 MMHG

## 2018-12-20 DIAGNOSIS — G47.33 OSA (OBSTRUCTIVE SLEEP APNEA): ICD-10-CM

## 2018-12-20 PROCEDURE — 99213 OFFICE O/P EST LOW 20 MIN: CPT | Performed by: FAMILY MEDICINE

## 2018-12-20 NOTE — PROGRESS NOTES
[facility] Follow Up Note     Date: 12/20/2018 / Time: 2:43 PM    Patient ID:   Name:             Mirtha Han   YOB: 1970  Age:                 48 y.o.  female   MRN:               [id number]      Thank you for requesting a sleep medicine consultation on Mirtha Han at the sleep center. She presents today with the chief complaints of WILFREDO follow up.     HISTORY OF PRESENT ILLNESS:       Pt is currently on CPAP 11 cm. Denies change in sleep or medical hx.She goes to sleep around 10-10:30 pm and wakes up around 5-7 am. She is getting about  hrs of sleep on a good night and about 5-6 hr of sleep on a bad night. The bad nights are about 1 per week. She is using CPAP most days of the week. Pt reports 1 hrs of average nightly use of CPAP. Pt denies snoring, gasping,choking.Pt also denies significant mask leak that is interfering with sleep.      The 30 day compliance was downloaded which shows adequate compliance with more that 4 hr usage about 13%. The AHI is has improved to 2.9/hr. The mask leak is normal. The symptoms of excessive daytime, snoring and gasping has improved with CPAP usage.         SLEEP HISTORY   2/7/14 via split night study. She had sever WILFREDO with AHI of 35.2/hr and O2 nydia 83%. Best tolerated pressure was CPAp 7 cm with improved AHI of 2.2/hr and O2 nydia 95%      REVIEW OF SYSTEMS:       Constitutional: Denies fevers, Denies weight changes  Eyes: Denies changes in vision, no eye pain  Ears/Nose/Throat/Mouth: Denies nasal congestion or sore throat   Cardiovascular: Denies chest pain or palpitations   Respiratory: Denies shortness of breath , Denies cough  Gastrointestinal/Hepatic: Denies abdominal pain, nausea, vomiting, diarrhea, constipation or GI bleeding   Genitourinary: Denies bladder dysfunction, dysuria or frequency  Musculoskeletal/Rheum: Denies  joint pain and swelling   Skin/Breast: Denies rash,   Neurological: Denies headache, confusion,  memory loss or focal weakness/parasthesias  Psychiatric: denies mood disorder   Sleep: denies snoring and apneas with the CPAP      Comprehensive review of systems form is reviewed with the patient and is attached in the EMR.     PMH:  has a past medical history of Chickenpox; Migraine headache; Pap smear; Sleep apnea; Snoring; and Stable burst fracture of first lumbar vertebra (HCC) (5/01).  MEDS:   Current Outpatient Prescriptions:   •  Cholecalciferol (VITAMIN D PO), Take  by mouth., Disp: , Rfl:   •  Albuterol Sulfate 108 (90 BASE) MCG/ACT AEROSOL POWDER, BREATH ACTIVATED, Inhale 1-2 Puffs by mouth every 6 hours as needed (coughing, wheezing)., Disp: 1 Each, Rfl: 0  •  lidocaine viscous 2% (XYLOCAINE) 2 % Solution, Take 15 mL by mouth 4 times a day as needed for Throat/Mouth Pain., Disp: 120 mL, Rfl: 0  •  topiramate (TOPAMAX) 25 MG Tab, Take 25 mg by mouth as needed., Disp: , Rfl:   •  amoxicillin (AMOXIL) 400 MG/5ML suspension, Take 10 mL by mouth 2 times a day., Disp: 200 mL, Rfl: 0  •  hydrocodone-acetaminophen 2.5-108 mg/5mL (HYCET) 7.5-325 MG/15ML solution, Take 15-30 mL by mouth 4 times a day as needed., Disp: 500 mL, Rfl: 0  •  ondansetron (ZOFRAN ODT) 4 MG TABLET DISPERSIBLE, Take 1 Tab by mouth every 6 hours as needed for Nausea/Vomiting., Disp: 10 Tab, Rfl: 0  •  PROPRANOLOL HCL, by Does not apply route. 2 x day for migraines, Disp: , Rfl:   •  Sumatriptan-Naproxen Sodium (TREXIMET)  MG TABS, Take  by mouth. Prn migraines, Disp: , Rfl:   ALLERGIES:   Allergies   Allergen Reactions   • Nkda [No Known Drug Allergy]      SURGHX:   Past Surgical History:   Procedure Laterality Date   • SEPTOPLASTY N/A 3/21/2016    Procedure: SEPTOPLASTY;  Surgeon: DELMA Rodriguez M.D.;  Location: SURGERY SAME DAY [facility];  Service:    • TONSILLECTOMY N/A 3/21/2016    Procedure: TONSILLECTOMY;  Surgeon: DELMA Rodriguez M.D.;  Location: SURGERY SAME DAY [facility];  Service:    • SEPTAL RECONSTRUCTION  "N/A 3/21/2016    Procedure: SEPTAL RECONSTRUCTION with  grafts ;  Surgeon: DELMA Rodriguez M.D.;  Location: SURGERY SAME DAY [facility];  Service:    • TURBINOPLASTY N/A 3/21/2016    Procedure: TURBINOPLASTY;  Surgeon: DELMA Rodriguez M.D.;  Location: SURGERY SAME DAY [facility];  Service:    • TUBAL COAGULATION LAPAROSCOPIC BILATERAL  6/9/2010    Performed by AGAPITO SUÁREZ at SURGERY SAME DAY [facility]   • OTHER ORTHOPEDIC SURGERY  1989    right foot surgery - bone removed     SOCHX:  reports that she has never smoked. She has never used smokeless tobacco. She reports that she drinks alcohol. She reports that she does not use drugs..  FH:   Family History   Problem Relation Age of Onset   • Diabetes Father    • Hypertension Mother    • Sleep Apnea Neg Hx          Physical Exam:  Vitals/ General Appearance:   Weight/BMI: Body mass index is 27.21 kg/m².  Blood pressure 122/82, pulse 74, resp. rate 15, height 1.549 m (5' 1\"), weight 65.3 kg (144 lb), SpO2 95 %.  Vitals:    12/20/18 1444   BP: 122/82   BP Location: Left arm   Patient Position: Sitting   BP Cuff Size: Adult   Pulse: 74   Resp: 15   SpO2: 95%   Weight: 65.3 kg (144 lb)   Height: 1.549 m (5' 1\")       Pt. is alert and oriented to time, place and person. Cooperative and in no apparent distress.       1. Head: Atraumatic, normocephalic.   2. Ears: Normal tympanic membrane and no discharge  3. Nose: No inferior turbinate hypertophy +  septal deviation  4. Throat: Oropharynx appears crowde in that the palate is overhanging   5. Neck: Supple. No thyromegaly  6. Chest: Trachea central, no spine deformity   7. Lungs auscultation: B/L good air entry, vesicular breath sounds, no adventitious sounds  8. Heart auscultation: 1st and 2nd heart sounds normal, regular rhythm. No appreciable murmur.  9.. Extremities: no pedal edema.  10. Skin: No rash  11. NEUROLOGICAL EXAMINATION: On neurological exam, the patient was alert and oriented x3. speech " was clear and fluent without dysarthria.      INVESTIGATIONS:           ASSESSMENT AND PLAN     1. Sleep Apnea (WILFREDO)    The pathophysiology of sleep anea and the increased risk of cardiovascular morbidity from untreated sleep apnea is discussed in detail with the patient.    She is urged to avoid supine sleep, weight gain and alcoholic beverages since all of these can worsen sleep apnea. She is cautioned against drowsy driving. If She feels sleepy while driving, She must pull over for a break/nap, rather than persist on the road, in the interest of She own safety and that of others on the road.   Plan   - Continue CPAP at 11 cm with nasal mask    - compliance download was reviewed and discussed with the pt   - compliance was reinforced     2. Regarding treatment of other past medical problems and general health maintenance,  She is urged to follow up with PCP.

## 2019-03-12 ENCOUNTER — HOSPITAL ENCOUNTER (OUTPATIENT)
Dept: LAB | Facility: MEDICAL CENTER | Age: 49
End: 2019-03-12
Attending: OBSTETRICS & GYNECOLOGY
Payer: COMMERCIAL

## 2019-03-12 ENCOUNTER — HOSPITAL ENCOUNTER (OUTPATIENT)
Dept: LAB | Facility: MEDICAL CENTER | Age: 49
End: 2019-03-12
Attending: INTERNAL MEDICINE
Payer: COMMERCIAL

## 2019-03-12 LAB
25(OH)D3 SERPL-MCNC: 38 NG/ML (ref 30–100)
ALBUMIN SERPL BCP-MCNC: 4.4 G/DL (ref 3.2–4.9)
ALBUMIN/GLOB SERPL: 1.4 G/DL
ALP SERPL-CCNC: 105 U/L (ref 30–99)
ALT SERPL-CCNC: 16 U/L (ref 2–50)
ANION GAP SERPL CALC-SCNC: 8 MMOL/L (ref 0–11.9)
AST SERPL-CCNC: 19 U/L (ref 12–45)
BILIRUB SERPL-MCNC: 0.5 MG/DL (ref 0.1–1.5)
BUN SERPL-MCNC: 11 MG/DL (ref 8–22)
CALCIUM SERPL-MCNC: 9.9 MG/DL (ref 8.5–10.5)
CHLORIDE SERPL-SCNC: 105 MMOL/L (ref 96–112)
CHOLEST SERPL-MCNC: 192 MG/DL (ref 100–199)
CO2 SERPL-SCNC: 28 MMOL/L (ref 20–33)
CREAT SERPL-MCNC: 0.64 MG/DL (ref 0.5–1.4)
ESTRADIOL SERPL-MCNC: <20 PG/ML
FSH SERPL-ACNC: 116.1 MIU/ML
GLOBULIN SER CALC-MCNC: 3.1 G/DL (ref 1.9–3.5)
GLUCOSE SERPL-MCNC: 103 MG/DL (ref 65–99)
HDLC SERPL-MCNC: 56 MG/DL
LDLC SERPL CALC-MCNC: 120 MG/DL
LH SERPL-ACNC: 33 IU/L
POTASSIUM SERPL-SCNC: 4.4 MMOL/L (ref 3.6–5.5)
PROLACTIN SERPL-MCNC: 7.19 NG/ML (ref 2.8–26)
PROT SERPL-MCNC: 7.5 G/DL (ref 6–8.2)
SODIUM SERPL-SCNC: 141 MMOL/L (ref 135–145)
TRIGL SERPL-MCNC: 78 MG/DL (ref 0–149)
TSH SERPL DL<=0.005 MIU/L-ACNC: 1.05 UIU/ML (ref 0.38–5.33)

## 2019-03-12 PROCEDURE — 36415 COLL VENOUS BLD VENIPUNCTURE: CPT

## 2019-03-12 PROCEDURE — 82670 ASSAY OF TOTAL ESTRADIOL: CPT

## 2019-03-12 PROCEDURE — 84146 ASSAY OF PROLACTIN: CPT

## 2019-03-12 PROCEDURE — 80053 COMPREHEN METABOLIC PANEL: CPT

## 2019-03-12 PROCEDURE — 83002 ASSAY OF GONADOTROPIN (LH): CPT

## 2019-03-12 PROCEDURE — 84443 ASSAY THYROID STIM HORMONE: CPT

## 2019-03-12 PROCEDURE — 80061 LIPID PANEL: CPT

## 2019-03-12 PROCEDURE — 82306 VITAMIN D 25 HYDROXY: CPT

## 2019-03-12 PROCEDURE — 83001 ASSAY OF GONADOTROPIN (FSH): CPT

## 2019-06-03 ENCOUNTER — SLEEP CENTER VISIT (OUTPATIENT)
Dept: SLEEP MEDICINE | Facility: MEDICAL CENTER | Age: 49
End: 2019-06-03
Payer: COMMERCIAL

## 2019-06-03 VITALS
HEIGHT: 61 IN | OXYGEN SATURATION: 97 % | SYSTOLIC BLOOD PRESSURE: 98 MMHG | RESPIRATION RATE: 16 BRPM | WEIGHT: 145 LBS | BODY MASS INDEX: 27.38 KG/M2 | DIASTOLIC BLOOD PRESSURE: 60 MMHG | HEART RATE: 81 BPM

## 2019-06-03 DIAGNOSIS — H04.123 CHRONIC DRYNESS OF BOTH EYES: ICD-10-CM

## 2019-06-03 DIAGNOSIS — G47.33 OSA (OBSTRUCTIVE SLEEP APNEA): ICD-10-CM

## 2019-06-03 DIAGNOSIS — Z78.9 NONSMOKER: ICD-10-CM

## 2019-06-03 DIAGNOSIS — Z86.69 HISTORY OF MIGRAINE: ICD-10-CM

## 2019-06-03 PROCEDURE — 99213 OFFICE O/P EST LOW 20 MIN: CPT | Performed by: NURSE PRACTITIONER

## 2019-06-03 NOTE — LETTER
SONG Garduno  [facility]   [address]   [place] [address]  Phone: [phone] - Fax: [phone]           Encounter Date: 6/3/2019  Provider: SONG Garduno  Location of Care: [facility]      Patient:   Mirtha Han   MR Number: [id number]   YOB: 1970     PROGRESS NOTE:  Chief Complaint   Patient presents with   • Apnea     CPAP at 11 cm        HPI:  Mirtha Han is a 48 y.o. year old female here today for follow-up on sleep apnea. 6mos f/u.   Please see Dr. Jarquin's dictation 12/20/18 for further history.    Hx of migraines.    Patient underwent PSG split night 2/2014 indicating severe WILFREDO with AHI of 35.2/hr and O2 nydia 83%. Best tolerated pressure was CPAP 7 cm with improved AHI of 2.2/hr and O2 nydia 95%.  She underwent UPPP.  She was evaluated by [facility] for dental appliance but felt not to be the best candidate and urged to use CPAP.    She remains on CPAP but has chronic dry eyes that become worse with CPAP use.  She has goggles at home but becomes clausterphobic with them and mask on. She currently uses a nasal mask that fits well with no significant large leak but enough leak that it effects her eyes. She uses eye drops t/o the day and night. She has plugs in her tear ducts. She is followed closely by opthamology.   Compliance card today would not download.  Prior compliance download 12/2018 indicated 13/30 days of use, avg nightly use of 3hr 8min and AHi 2.9. No significant mask leaking.    Today she denies cardiac or respiratory symptoms. She has fatigue. She denies morning headaches or waking out of breath. She attempts to sleep on her side. She notes migraines are about 2x's monthly.      ROS: As per HPI and otherwise negative if not stated.    Past Medical History:   Diagnosis Date   • Chickenpox    • Migraine headache    • Pap smear     Dr. Singletary  "Ishan   • Sleep apnea     CPAP use   • Snoring    • Stable burst fracture of first lumbar vertebra (HCC) 5/01       Past Surgical History:   Procedure Laterality Date   • SEPTOPLASTY N/A 3/21/2016    Procedure: SEPTOPLASTY;  Surgeon: DELMA Rodriguez M.D.;  Location: SURGERY SAME DAY [facility];  Service:    • TONSILLECTOMY N/A 3/21/2016    Procedure: TONSILLECTOMY;  Surgeon: DELMA Rodriguez M.D.;  Location: SURGERY SAME DAY [facility];  Service:    • SEPTAL RECONSTRUCTION N/A 3/21/2016    Procedure: SEPTAL RECONSTRUCTION with  grafts ;  Surgeon: DELMA Rodriguez M.D.;  Location: SURGERY SAME DAY [facility];  Service:    • TURBINOPLASTY N/A 3/21/2016    Procedure: TURBINOPLASTY;  Surgeon: DELMA Rodriguez M.D.;  Location: SURGERY SAME DAY [facility];  Service:    • TUBAL COAGULATION LAPAROSCOPIC BILATERAL  6/9/2010    Performed by AGAPITO SUÁREZ at SURGERY SAME DAY [facility]S   • OTHER ORTHOPEDIC SURGERY  1989    right foot surgery - bone removed       Family History   Problem Relation Age of Onset   • Diabetes Father    • Hypertension Mother    • Sleep Apnea Neg Hx        Social History     Social History   • Marital status:      Spouse name: N/A   • Number of children: N/A   • Years of education: N/A     Occupational History   • office work [employer]     Social History Main Topics   • Smoking status: Never Smoker   • Smokeless tobacco: Never Used   • Alcohol use Yes      Comment: 4 per month   • Drug use: No   • Sexual activity: Yes     Birth control/ protection: Pill     Other Topics Concern   • Not on file     Social History Narrative   • No narrative on file       Allergies as of 06/03/2019 - Reviewed 06/03/2019   Allergen Reaction Noted   • Nkda [no known drug allergy]  06/03/2010        @Vital signs for this encounter:  Vitals:    06/03/19 1422   Height: 1.549 m (5' 1\")   Weight: 65.8 kg (145 lb)   Weight % change since last entry.: 0 %   BP: (!) 98/60   Pulse: 81   BMI " (Calculated): 27.4   Resp: 16   O2 sat % room air: 97 %       Current medications as of today   Current Outpatient Prescriptions   Medication Sig Dispense Refill   • Cholecalciferol (VITAMIN D PO) Take  by mouth.     • topiramate (TOPAMAX) 25 MG Tab Take 25 mg by mouth as needed.     • PROPRANOLOL HCL by Does not apply route. 2 x day for migraines     • Albuterol Sulfate 108 (90 BASE) MCG/ACT AEROSOL POWDER, BREATH ACTIVATED Inhale 1-2 Puffs by mouth every 6 hours as needed (coughing, wheezing). 1 Each 0   • amoxicillin (AMOXIL) 400 MG/5ML suspension Take 10 mL by mouth 2 times a day. 200 mL 0   • Sumatriptan-Naproxen Sodium (TREXIMET)  MG TABS Take  by mouth. Prn migraines       No current facility-administered medications for this visit.          Physical Exam:   Gen:           Alert and oriented, No apparent distress. Mood and affect appropriate, normal interaction with examiner.  Eyes:          PERRL, EOM intact, sclere white, conjunctive moist.  Ears:          Not examined.   Hearing:     Grossly intact.  Nose:          Normal, no lesions or deformities.  Dentition:    Good dentition.  Oropharynx:   Tongue normal.  Mallampati Classification: not examined.  Neck:        Supple, trachea midline, no masses.  Respiratory Effort: No intercostal retractions or use of accessory muscles.   Lung Auscultation:      Clear to auscultation bilaterally; no rales, rhonchi or wheezing.  CV:            Regular rate and rhythm. No murmurs, rubs or gallops.  Abd:           Not examined.   Lymphadenopathy: Not examined.  Gait and Station: Normal.  Digits and Nails: No clubbing, cyanosis, petechiae, or nodes.   Cranial Nerves: II-XII grossly intact.  Skin:        No rashes, lesions or ulcers noted.               Ext:           No cyanosis or edema.      Assessment:  1. WILFREDO (obstructive sleep apnea)     2. BMI 27.0-27.9,adult     3. Nonsmoker     4. History of migraine         Immunizations:    Flu:10/2018  Pneumovax 23:due  age 67  Prevnar 13:due age 67    Plan: WILFREDO is clinically stable.  1. Continue CPAP nightly.  DME mask/supplies.  DME order to assess compliance chip and replace if malfunctioning and to also run 90 days report of usage.  Add syran wrap to eye area at night to prevent dryness and continue lubricating eye drops.  2. Discussed sleep hygiene.  Strongly encouraged consistent nightly usage and to attempt desensitization by using mask/goggles during the day.  3. Encouraged routine activity.  4. Follow up in 6 months with compliance card, sooner if needed.      Please note that this dictation was created using voice recognition software. I have made every reasonable attempt to correct obvious errors, but it is possible there are errors of grammar and possibly content that I did not discover before finalizing the note.      Electronically signed by SONG Garduno  on 06/03/19    Marco Antonio Tolbert M.D.  [address] [place] [address] [address]  [place] [address]  VIA Facsimile: [phone]

## 2019-06-03 NOTE — PROGRESS NOTES
Chief Complaint   Patient presents with   • Apnea     CPAP at 11 cm        HPI:  Mirtha Han is a 48 y.o. year old female here today for follow-up on sleep apnea. 6mos f/u.   Please see Dr. Jarquin's dictation 12/20/18 for further history.    Hx of migraines.    Patient underwent PSG split night 2/2014 indicating severe WILFREDO with AHI of 35.2/hr and O2 nydia 83%. Best tolerated pressure was CPAP 7 cm with improved AHI of 2.2/hr and O2 nydia 95%.  She underwent UPPP.  She was evaluated by [facility] for dental appliance but felt not to be the best candidate and urged to use CPAP.    She remains on CPAP but has chronic dry eyes that become worse with CPAP use.  She has goggles at home but becomes clausterphobic with them and mask on. She currently uses a nasal mask that fits well with no significant large leak but enough leak that it effects her eyes. She uses eye drops t/o the day and night. She has plugs in her tear ducts. She is followed closely by opthamology.   Compliance card today would not download.  Prior compliance download 12/2018 indicated 13/30 days of use, avg nightly use of 3hr 8min and AHi 2.9. No significant mask leaking.    Today she denies cardiac or respiratory symptoms. She has fatigue. She denies morning headaches or waking out of breath. She attempts to sleep on her side. She notes migraines are about 2x's monthly.      ROS: As per HPI and otherwise negative if not stated.    Past Medical History:   Diagnosis Date   • Chickenpox    • Migraine headache    • Pap smear     Dr. Gemini Olmstead   • Sleep apnea     CPAP use   • Snoring    • Stable burst fracture of first lumbar vertebra (HCC) 5/01       Past Surgical History:   Procedure Laterality Date   • SEPTOPLASTY N/A 3/21/2016    Procedure: SEPTOPLASTY;  Surgeon: DELMA Rodriguez M.D.;  Location: SURGERY SAME DAY [facility];  Service:    • TONSILLECTOMY N/A 3/21/2016    Procedure: TONSILLECTOMY;  Surgeon: DELMA Rodriguez M.D.;   "Location: SURGERY SAME DAY [facility];  Service:    • SEPTAL RECONSTRUCTION N/A 3/21/2016    Procedure: SEPTAL RECONSTRUCTION with  grafts ;  Surgeon: DELMA Rodriguez M.D.;  Location: SURGERY SAME DAY [facility];  Service:    • TURBINOPLASTY N/A 3/21/2016    Procedure: TURBINOPLASTY;  Surgeon: DELMA Rodriguez M.D.;  Location: SURGERY SAME DAY [facility];  Service:    • TUBAL COAGULATION LAPAROSCOPIC BILATERAL  6/9/2010    Performed by AGAPITO SUÁREZ at SURGERY SAME DAY [facility]   • OTHER ORTHOPEDIC SURGERY  1989    right foot surgery - bone removed       Family History   Problem Relation Age of Onset   • Diabetes Father    • Hypertension Mother    • Sleep Apnea Neg Hx        Social History     Social History   • Marital status:      Spouse name: N/A   • Number of children: N/A   • Years of education: N/A     Occupational History   • office work [employer]     Social History Main Topics   • Smoking status: Never Smoker   • Smokeless tobacco: Never Used   • Alcohol use Yes      Comment: 4 per month   • Drug use: No   • Sexual activity: Yes     Birth control/ protection: Pill     Other Topics Concern   • Not on file     Social History Narrative   • No narrative on file       Allergies as of 06/03/2019 - Reviewed 06/03/2019   Allergen Reaction Noted   • Nkda [no known drug allergy]  06/03/2010        @Vital signs for this encounter:  Vitals:    06/03/19 1422   Height: 1.549 m (5' 1\")   Weight: 65.8 kg (145 lb)   Weight % change since last entry.: 0 %   BP: (!) 98/60   Pulse: 81   BMI (Calculated): 27.4   Resp: 16   O2 sat % room air: 97 %       Current medications as of today   Current Outpatient Prescriptions   Medication Sig Dispense Refill   • Cholecalciferol (VITAMIN D PO) Take  by mouth.     • topiramate (TOPAMAX) 25 MG Tab Take 25 mg by mouth as needed.     • PROPRANOLOL HCL by Does not apply route. 2 x day for migraines     • Albuterol Sulfate 108 (90 BASE) MCG/ACT AEROSOL POWDER, " BREATH ACTIVATED Inhale 1-2 Puffs by mouth every 6 hours as needed (coughing, wheezing). 1 Each 0   • amoxicillin (AMOXIL) 400 MG/5ML suspension Take 10 mL by mouth 2 times a day. 200 mL 0   • Sumatriptan-Naproxen Sodium (TREXIMET)  MG TABS Take  by mouth. Prn migraines       No current facility-administered medications for this visit.          Physical Exam:   Gen:           Alert and oriented, No apparent distress. Mood and affect appropriate, normal interaction with examiner.  Eyes:          PERRL, EOM intact, sclere white, conjunctive moist.  Ears:          Not examined.   Hearing:     Grossly intact.  Nose:          Normal, no lesions or deformities.  Dentition:    Good dentition.  Oropharynx:   Tongue normal.  Mallampati Classification: not examined.  Neck:        Supple, trachea midline, no masses.  Respiratory Effort: No intercostal retractions or use of accessory muscles.   Lung Auscultation:      Clear to auscultation bilaterally; no rales, rhonchi or wheezing.  CV:            Regular rate and rhythm. No murmurs, rubs or gallops.  Abd:           Not examined.   Lymphadenopathy: Not examined.  Gait and Station: Normal.  Digits and Nails: No clubbing, cyanosis, petechiae, or nodes.   Cranial Nerves: II-XII grossly intact.  Skin:        No rashes, lesions or ulcers noted.               Ext:           No cyanosis or edema.      Assessment:  1. WILFREDO (obstructive sleep apnea)     2. BMI 27.0-27.9,adult     3. Nonsmoker     4. History of migraine         Immunizations:    Flu:10/2018  Pneumovax 23:due age 67  Prevnar 13:due age 67    Plan: WILFREDO is clinically stable.  1. Continue CPAP nightly.  DME mask/supplies.  DME order to assess compliance chip and replace if malfunctioning and to also run 90 days report of usage.  Add syran wrap to eye area at night to prevent dryness and continue lubricating eye drops.  2. Discussed sleep hygiene.  Strongly encouraged consistent nightly usage and to attempt  desensitization by using mask/goggles during the day.  3. Encouraged routine activity.  4. Follow up in 6 months with compliance card, sooner if needed.      Please note that this dictation was created using voice recognition software. I have made every reasonable attempt to correct obvious errors, but it is possible there are errors of grammar and possibly content that I did not discover before finalizing the note.

## 2019-12-02 ENCOUNTER — OFFICE VISIT (OUTPATIENT)
Dept: PULMONOLOGY | Facility: HOSPICE | Age: 49
End: 2019-12-02
Payer: COMMERCIAL

## 2019-12-02 VITALS
OXYGEN SATURATION: 96 % | RESPIRATION RATE: 16 BRPM | DIASTOLIC BLOOD PRESSURE: 84 MMHG | HEART RATE: 88 BPM | SYSTOLIC BLOOD PRESSURE: 124 MMHG | BODY MASS INDEX: 27 KG/M2 | WEIGHT: 143 LBS | HEIGHT: 61 IN

## 2019-12-02 DIAGNOSIS — H04.123 CHRONIC DRYNESS OF BOTH EYES: ICD-10-CM

## 2019-12-02 DIAGNOSIS — Z78.9 NONSMOKER: ICD-10-CM

## 2019-12-02 DIAGNOSIS — Z86.69 HISTORY OF MIGRAINE: ICD-10-CM

## 2019-12-02 DIAGNOSIS — G47.33 OSA (OBSTRUCTIVE SLEEP APNEA): ICD-10-CM

## 2019-12-02 PROCEDURE — 99214 OFFICE O/P EST MOD 30 MIN: CPT | Performed by: NURSE PRACTITIONER

## 2019-12-02 NOTE — PROGRESS NOTES
Chief Complaint   Patient presents with   • Follow-Up       HPI:  Mirtha Han is a 49 y.o. year old female here today for follow-up on WILFREDO.    Patient has a history of migraines and chronic dry eye.  BMI 27.    PSG split-night February 2014 indicates severe WILFREDO with an AHI 35.2/h and a nydia of 83%.  Best pressure tolerated CPAP 7 cm nightly with a reduced AHI of 2.2/h and a nydia of 95%.  She underwent UPPP.  She was evaluated by [facility] for dental appliance with felt not to be the best candidate and urged to CPAP.  She is currently using CPAP 11 cm nightly.  Compliance card 11/2/2019 through 12/1/2019 indicates 100% compliance, average nightly use of 6 hours 6 minutes, no significant mask leaking with an overall AHI of 2.7/h.  Patient notes feeling more rested on therapy and denies morning headaches.  She notes her chronic migraines have reduced.  She notes dry eye to still be a problem but improved and using eyedrops before CPAP use.  She denies any significant cardiac or respiratory symptoms.  She notes an occasional cough with clear phlegm that is started with cold weather.  She denies any sinus congestion or postnasal drip.    She notes her current CPAP device to be 5 years old and would like a new one.          ROS: As per HPI and otherwise negative if not stated.    Past Medical History:   Diagnosis Date   • Chickenpox    • Migraine headache    • Pap smear     Dr. Gemini Olmstead   • Sleep apnea     CPAP use   • Snoring    • Stable burst fracture of first lumbar vertebra (HCC) 5/01       Past Surgical History:   Procedure Laterality Date   • SEPTOPLASTY N/A 3/21/2016    Procedure: SEPTOPLASTY;  Surgeon: DELMA Rodriguez M.D.;  Location: SURGERY SAME DAY [facility];  Service:    • TONSILLECTOMY N/A 3/21/2016    Procedure: TONSILLECTOMY;  Surgeon: DELMA Rodriguez M.D.;  Location: SURGERY SAME DAY [facility];  Service:    • SEPTAL RECONSTRUCTION N/A 3/21/2016    Procedure: SEPTAL  RECONSTRUCTION with  grafts ;  Surgeon: DELMA Rodriguez M.D.;  Location: SURGERY SAME DAY [facility];  Service:    • TURBINOPLASTY N/A 3/21/2016    Procedure: TURBINOPLASTY;  Surgeon: DELMA Rodriguez M.D.;  Location: SURGERY SAME DAY [facility];  Service:    • TUBAL COAGULATION LAPAROSCOPIC BILATERAL  6/9/2010    Performed by AGAPITO SUÁREZ at SURGERY SAME DAY [facility]   • OTHER ORTHOPEDIC SURGERY  1989    right foot surgery - bone removed       Family History   Problem Relation Age of Onset   • Diabetes Father    • Hypertension Mother    • Sleep Apnea Neg Hx        Social History     Socioeconomic History   • Marital status:      Spouse name: Not on file   • Number of children: Not on file   • Years of education: Not on file   • Highest education level: Not on file   Occupational History   • Occupation: office work     Employer: [employer]   Social Needs   • Financial resource strain: Not on file   • Food insecurity:     Worry: Not on file     Inability: Not on file   • Transportation needs:     Medical: Not on file     Non-medical: Not on file   Tobacco Use   • Smoking status: Never Smoker   • Smokeless tobacco: Never Used   Substance and Sexual Activity   • Alcohol use: Yes     Comment: 4 per month   • Drug use: No   • Sexual activity: Yes     Birth control/protection: Pill   Lifestyle   • Physical activity:     Days per week: Not on file     Minutes per session: Not on file   • Stress: Not on file   Relationships   • Social connections:     Talks on phone: Not on file     Gets together: Not on file     Attends Christianity service: Not on file     Active member of club or organization: Not on file     Attends meetings of clubs or organizations: Not on file     Relationship status: Not on file   • Intimate partner violence:     Fear of current or ex partner: Not on file     Emotionally abused: Not on file     Physically abused: Not on file     Forced sexual activity: Not on file   Other  "Topics Concern   • Not on file   Social History Narrative   • Not on file       Allergies as of 12/02/2019 - Reviewed 12/02/2019   Allergen Reaction Noted   • Nkda [no known drug allergy]  06/03/2010        Vitals:  /84   Pulse 88   Resp 16   Ht 1.549 m (5' 1\")   Wt 64.9 kg (143 lb)   SpO2 96%     Current medications as of today   Current Outpatient Medications   Medication Sig Dispense Refill   • Cholecalciferol (VITAMIN D PO) Take  by mouth.     • topiramate (TOPAMAX) 25 MG Tab Take 25 mg by mouth as needed.     • PROPRANOLOL HCL by Does not apply route. 2 x day for migraines     • Sumatriptan-Naproxen Sodium (TREXIMET)  MG TABS Take  by mouth. Prn migraines       No current facility-administered medications for this visit.          Physical Exam:   Gen:           Alert and oriented, No apparent distress. Mood and affect appropriate, normal interaction with examiner.  Eyes:          PERRL, EOM intact, sclere white, conjunctive moist.  Ears:          Not examined.   Hearing:     Grossly intact.  Nose:          Normal, no lesions or deformities.  Dentition:    Good dentition.  Oropharynx:   Tongue normal, posterior pharynx without erythema or exudate.  Mallampati Classification: 2/3  Neck:        Supple, trachea midline, no masses.  Respiratory Effort: No intercostal retractions or use of accessory muscles.   Lung Auscultation:      Clear to auscultation bilaterally; no rales, rhonchi or wheezing.  CV:            Regular rate and rhythm. No murmurs, rubs or gallops.  Abd:           Not examined.   Lymphadenopathy: Not examined.  Gait and Station: Normal.  Digits and Nails: No clubbing, cyanosis, petechiae, or nodes.   Cranial Nerves: II-XII grossly intact.  Skin:        No rashes, lesions or ulcers noted.               Ext:           No cyanosis or edema.      Assessment:  1. WILFREDO (obstructive sleep apnea)  DME Mask and Supplies    DME CPAP   2. History of migraine     3. Chronic dryness of both eyes "     4. Nonsmoker     5. BMI 27.0-27.9,adult         Immunizations:    Flu:11/2019  Pneumovax 23:not due  Prevnar 13:not due    Plan:  1.  WILFREDO is clinically stable.  Continue CPAP 11 cm nightly.  DME mask/supplies.  DME CPAP to replace her current one.  She notes her current device to be 5 years or older.  2.  Discussed sleep hygiene.  3.  Follow-up with primary care for other health concerns.  4.  Follow-up in 1 year with compliance report, sooner if needed.    Please note that this dictation was created using voice recognition software. I have made every reasonable attempt to correct obvious errors, but it is possible there are errors of grammar and possibly content that I did not discover before finalizing the note.

## 2019-12-18 ENCOUNTER — HOSPITAL ENCOUNTER (OUTPATIENT)
Dept: LAB | Facility: MEDICAL CENTER | Age: 49
End: 2019-12-18
Attending: INTERNAL MEDICINE
Payer: COMMERCIAL

## 2019-12-18 LAB
25(OH)D3 SERPL-MCNC: 44 NG/ML (ref 30–100)
ALBUMIN SERPL BCP-MCNC: 4.7 G/DL (ref 3.2–4.9)
ALBUMIN/GLOB SERPL: 1.7 G/DL
ALP SERPL-CCNC: 107 U/L (ref 30–99)
ALT SERPL-CCNC: 15 U/L (ref 2–50)
ANION GAP SERPL CALC-SCNC: 10 MMOL/L (ref 0–11.9)
AST SERPL-CCNC: 18 U/L (ref 12–45)
BASOPHILS # BLD AUTO: 1.1 % (ref 0–1.8)
BASOPHILS # BLD: 0.06 K/UL (ref 0–0.12)
BILIRUB SERPL-MCNC: 0.4 MG/DL (ref 0.1–1.5)
BUN SERPL-MCNC: 15 MG/DL (ref 8–22)
CALCIUM SERPL-MCNC: 9.6 MG/DL (ref 8.5–10.5)
CHLORIDE SERPL-SCNC: 105 MMOL/L (ref 96–112)
CHOLEST SERPL-MCNC: 192 MG/DL (ref 100–199)
CO2 SERPL-SCNC: 27 MMOL/L (ref 20–33)
CREAT SERPL-MCNC: 0.66 MG/DL (ref 0.5–1.4)
EOSINOPHIL # BLD AUTO: 0.07 K/UL (ref 0–0.51)
EOSINOPHIL NFR BLD: 1.3 % (ref 0–6.9)
ERYTHROCYTE [DISTWIDTH] IN BLOOD BY AUTOMATED COUNT: 41.8 FL (ref 35.9–50)
EST. AVERAGE GLUCOSE BLD GHB EST-MCNC: 120 MG/DL
FASTING STATUS PATIENT QL REPORTED: NORMAL
GLOBULIN SER CALC-MCNC: 2.7 G/DL (ref 1.9–3.5)
GLUCOSE SERPL-MCNC: 97 MG/DL (ref 65–99)
HBA1C MFR BLD: 5.8 % (ref 0–5.6)
HCT VFR BLD AUTO: 44.4 % (ref 37–47)
HDLC SERPL-MCNC: 49 MG/DL
HGB BLD-MCNC: 14.6 G/DL (ref 12–16)
IMM GRANULOCYTES # BLD AUTO: 0.02 K/UL (ref 0–0.11)
IMM GRANULOCYTES NFR BLD AUTO: 0.4 % (ref 0–0.9)
LDLC SERPL CALC-MCNC: 127 MG/DL
LYMPHOCYTES # BLD AUTO: 2.02 K/UL (ref 1–4.8)
LYMPHOCYTES NFR BLD: 38.5 % (ref 22–41)
MCH RBC QN AUTO: 30 PG (ref 27–33)
MCHC RBC AUTO-ENTMCNC: 32.9 G/DL (ref 33.6–35)
MCV RBC AUTO: 91.2 FL (ref 81.4–97.8)
MONOCYTES # BLD AUTO: 0.36 K/UL (ref 0–0.85)
MONOCYTES NFR BLD AUTO: 6.9 % (ref 0–13.4)
NEUTROPHILS # BLD AUTO: 2.72 K/UL (ref 2–7.15)
NEUTROPHILS NFR BLD: 51.8 % (ref 44–72)
NRBC # BLD AUTO: 0 K/UL
NRBC BLD-RTO: 0 /100 WBC
PLATELET # BLD AUTO: 248 K/UL (ref 164–446)
PMV BLD AUTO: 12 FL (ref 9–12.9)
POTASSIUM SERPL-SCNC: 4.7 MMOL/L (ref 3.6–5.5)
PROT SERPL-MCNC: 7.4 G/DL (ref 6–8.2)
RBC # BLD AUTO: 4.87 M/UL (ref 4.2–5.4)
SODIUM SERPL-SCNC: 142 MMOL/L (ref 135–145)
T4 FREE SERPL-MCNC: 0.81 NG/DL (ref 0.53–1.43)
TRIGL SERPL-MCNC: 79 MG/DL (ref 0–149)
TSH SERPL DL<=0.005 MIU/L-ACNC: 0.7 UIU/ML (ref 0.38–5.33)
WBC # BLD AUTO: 5.3 K/UL (ref 4.8–10.8)

## 2019-12-18 PROCEDURE — 82306 VITAMIN D 25 HYDROXY: CPT

## 2019-12-18 PROCEDURE — 85025 COMPLETE CBC W/AUTO DIFF WBC: CPT

## 2019-12-18 PROCEDURE — 80053 COMPREHEN METABOLIC PANEL: CPT

## 2019-12-18 PROCEDURE — 36415 COLL VENOUS BLD VENIPUNCTURE: CPT

## 2019-12-18 PROCEDURE — 80061 LIPID PANEL: CPT

## 2019-12-18 PROCEDURE — 84443 ASSAY THYROID STIM HORMONE: CPT

## 2019-12-18 PROCEDURE — 84439 ASSAY OF FREE THYROXINE: CPT

## 2019-12-18 PROCEDURE — 83036 HEMOGLOBIN GLYCOSYLATED A1C: CPT

## 2020-01-20 ENCOUNTER — HOSPITAL ENCOUNTER (OUTPATIENT)
Dept: LAB | Facility: MEDICAL CENTER | Age: 50
End: 2020-01-20
Attending: INTERNAL MEDICINE
Payer: COMMERCIAL

## 2020-01-20 LAB — MAGNESIUM SERPL-MCNC: 1.8 MG/DL (ref 1.5–2.5)

## 2020-01-20 PROCEDURE — 83735 ASSAY OF MAGNESIUM: CPT

## 2020-01-20 PROCEDURE — 36415 COLL VENOUS BLD VENIPUNCTURE: CPT

## 2020-01-20 PROCEDURE — 80053 COMPREHEN METABOLIC PANEL: CPT

## 2020-01-21 LAB
ALBUMIN SERPL BCP-MCNC: 4.3 G/DL (ref 3.2–4.9)
ALBUMIN/GLOB SERPL: 1.5 G/DL
ALP SERPL-CCNC: 95 U/L (ref 30–99)
ALT SERPL-CCNC: 15 U/L (ref 2–50)
ANION GAP SERPL CALC-SCNC: 9 MMOL/L (ref 0–11.9)
AST SERPL-CCNC: 17 U/L (ref 12–45)
BILIRUB SERPL-MCNC: 0.3 MG/DL (ref 0.1–1.5)
BUN SERPL-MCNC: 9 MG/DL (ref 8–22)
CALCIUM SERPL-MCNC: 9.6 MG/DL (ref 8.5–10.5)
CHLORIDE SERPL-SCNC: 105 MMOL/L (ref 96–112)
CO2 SERPL-SCNC: 25 MMOL/L (ref 20–33)
CREAT SERPL-MCNC: 0.64 MG/DL (ref 0.5–1.4)
GLOBULIN SER CALC-MCNC: 2.8 G/DL (ref 1.9–3.5)
GLUCOSE SERPL-MCNC: 96 MG/DL (ref 65–99)
POTASSIUM SERPL-SCNC: 4.5 MMOL/L (ref 3.6–5.5)
PROT SERPL-MCNC: 7.1 G/DL (ref 6–8.2)
SODIUM SERPL-SCNC: 139 MMOL/L (ref 135–145)

## 2020-07-13 ENCOUNTER — HOSPITAL ENCOUNTER (OUTPATIENT)
Dept: LAB | Facility: MEDICAL CENTER | Age: 50
End: 2020-07-13
Attending: INTERNAL MEDICINE
Payer: COMMERCIAL

## 2020-07-13 LAB
ALBUMIN SERPL BCP-MCNC: 4.4 G/DL (ref 3.2–4.9)
ALBUMIN/GLOB SERPL: 1.8 G/DL
ALP SERPL-CCNC: 117 U/L (ref 30–99)
ALT SERPL-CCNC: 21 U/L (ref 2–50)
ANION GAP SERPL CALC-SCNC: 13 MMOL/L (ref 7–16)
AST SERPL-CCNC: 22 U/L (ref 12–45)
BASOPHILS # BLD AUTO: 0.8 % (ref 0–1.8)
BASOPHILS # BLD: 0.04 K/UL (ref 0–0.12)
BILIRUB SERPL-MCNC: 0.4 MG/DL (ref 0.1–1.5)
BUN SERPL-MCNC: 10 MG/DL (ref 8–22)
CALCIUM SERPL-MCNC: 9.3 MG/DL (ref 8.5–10.5)
CHLORIDE SERPL-SCNC: 105 MMOL/L (ref 96–112)
CO2 SERPL-SCNC: 24 MMOL/L (ref 20–33)
COMMENT 1642: NORMAL
CREAT SERPL-MCNC: 0.55 MG/DL (ref 0.5–1.4)
EOSINOPHIL # BLD AUTO: 0.06 K/UL (ref 0–0.51)
EOSINOPHIL NFR BLD: 1.3 % (ref 0–6.9)
ERYTHROCYTE [DISTWIDTH] IN BLOOD BY AUTOMATED COUNT: 42.3 FL (ref 35.9–50)
FASTING STATUS PATIENT QL REPORTED: NORMAL
GLOBULIN SER CALC-MCNC: 2.5 G/DL (ref 1.9–3.5)
GLUCOSE SERPL-MCNC: 104 MG/DL (ref 65–99)
HCT VFR BLD AUTO: 42.4 % (ref 37–47)
HGB BLD-MCNC: 14.2 G/DL (ref 12–16)
IMM GRANULOCYTES # BLD AUTO: 0.01 K/UL (ref 0–0.11)
IMM GRANULOCYTES NFR BLD AUTO: 0.2 % (ref 0–0.9)
LYMPHOCYTES # BLD AUTO: 1.97 K/UL (ref 1–4.8)
LYMPHOCYTES NFR BLD: 41.1 % (ref 22–41)
MCH RBC QN AUTO: 30.1 PG (ref 27–33)
MCHC RBC AUTO-ENTMCNC: 33.5 G/DL (ref 33.6–35)
MCV RBC AUTO: 89.8 FL (ref 81.4–97.8)
MONOCYTES # BLD AUTO: 0.32 K/UL (ref 0–0.85)
MONOCYTES NFR BLD AUTO: 6.7 % (ref 0–13.4)
MORPHOLOGY BLD-IMP: NORMAL
NEUTROPHILS # BLD AUTO: 2.39 K/UL (ref 2–7.15)
NEUTROPHILS NFR BLD: 49.9 % (ref 44–72)
NRBC # BLD AUTO: 0 K/UL
NRBC BLD-RTO: 0 /100 WBC
PLATELET # BLD AUTO: 160 K/UL (ref 164–446)
PMV BLD AUTO: 12.8 FL (ref 9–12.9)
POTASSIUM SERPL-SCNC: 4.7 MMOL/L (ref 3.6–5.5)
PROT SERPL-MCNC: 6.9 G/DL (ref 6–8.2)
RBC # BLD AUTO: 4.72 M/UL (ref 4.2–5.4)
SODIUM SERPL-SCNC: 142 MMOL/L (ref 135–145)
VIT B12 SERPL-MCNC: 665 PG/ML (ref 211–911)
WBC # BLD AUTO: 4.8 K/UL (ref 4.8–10.8)

## 2020-07-13 PROCEDURE — 83036 HEMOGLOBIN GLYCOSYLATED A1C: CPT

## 2020-07-13 PROCEDURE — 36415 COLL VENOUS BLD VENIPUNCTURE: CPT

## 2020-07-13 PROCEDURE — 82607 VITAMIN B-12: CPT

## 2020-07-13 PROCEDURE — 80053 COMPREHEN METABOLIC PANEL: CPT

## 2020-07-13 PROCEDURE — 85025 COMPLETE CBC W/AUTO DIFF WBC: CPT

## 2020-12-07 ENCOUNTER — TELEMEDICINE (OUTPATIENT)
Dept: SLEEP MEDICINE | Facility: MEDICAL CENTER | Age: 50
End: 2020-12-07
Payer: COMMERCIAL

## 2020-12-07 VITALS — BODY MASS INDEX: 27.75 KG/M2 | WEIGHT: 147 LBS | HEIGHT: 61 IN

## 2020-12-07 DIAGNOSIS — Z78.9 NONSMOKER: ICD-10-CM

## 2020-12-07 DIAGNOSIS — G47.33 OSA (OBSTRUCTIVE SLEEP APNEA): Chronic | ICD-10-CM

## 2020-12-07 PROCEDURE — 99213 OFFICE O/P EST LOW 20 MIN: CPT | Mod: 95,CR | Performed by: NURSE PRACTITIONER

## 2020-12-07 ASSESSMENT — FIBROSIS 4 INDEX: FIB4 SCORE: 1.440238075557549716

## 2020-12-07 NOTE — PROGRESS NOTES
Virtual Visit: Established Patient   This visit was conducted via Zoom using secure and encrypted videoconferencing technology. The patient was in a private location in the state of [place].    The patient's identity was confirmed and verbal consent was obtained for this virtual visit.    Subjective:   CC:   Chief Complaint   Patient presents with   • Apnea       Mirtha Han is a 50 y.o. female presenting for evaluation and management of:    WILFREDO  Last OV 12/2/19    Patient has a history of migraines and chronic dry eye.  BMI 27.     PSG split-night February 2014 indicates severe WILFREDO with an AHI 35.2/h and a nydia of 83%.  Best pressure tolerated CPAP 7 cm nightly with a reduced AHI of 2.2/h and a nydia of 95%.  She underwent UPPP.  She was evaluated by [facility] for dental appliance with felt not to be the best candidate and urged to CPAP.  She is currently using CPAP 11 cm nightly.  She obtain new device in 2019.  Compliance card 11/7/2020 through 12/6/2020 indicates 96.7% compliance, average nightly use 5 hours 4 minutes, no significant mask leak with reduced AHI 3.6/h.  Reviewed findings with patient.  She tolerates her mask and pressure well. She notes her sleep to be worse in the last 2 weeks and using OTC sleep aide and and does not use every day. She notes going to bed about 9:30pm and waking by 5am. She has been waking up 1x at night on regular days.   She denies cardiac or respiratory issues. NO GERD or allergies.     ROS in HPI; otherwise negative.    Allergies   Allergen Reactions   • Nkda [No Known Drug Allergy]        Current medicines (including changes today)  Current Outpatient Medications   Medication Sig Dispense Refill   • Cholecalciferol (VITAMIN D PO) Take  by mouth.     • PROPRANOLOL HCL by Does not apply route. 2 x day for migraines     • topiramate (TOPAMAX) 25 MG Tab Take 25 mg by mouth as needed.       No current facility-administered medications for this visit.        Patient  "Active Problem List    Diagnosis Date Noted   • Nonsmoker 06/03/2019   • History of migraine 06/03/2019   • Chronic dryness of both eyes 06/03/2019   • WILFREDO (obstructive sleep apnea) 09/12/2018   • Deviated nasal septum 03/21/2016   • Migraine headache        Family History   Problem Relation Age of Onset   • Diabetes Father    • Hypertension Mother    • Sleep Apnea Neg Hx        She  has a past medical history of Chickenpox, Migraine headache, Pap smear, Sleep apnea, Snoring, and Stable burst fracture of first lumbar vertebra (HCC) (5/01).  She  has a past surgical history that includes other orthopedic surgery (1989); tubal coagulation laparoscopic bilateral (6/9/2010); septoplasty (N/A, 3/21/2016); tonsillectomy (N/A, 3/21/2016); septal reconstruction (N/A, 3/21/2016); and turbinoplasty (N/A, 3/21/2016).       Objective:   Ht 1.549 m (5' 1\")   Wt 66.7 kg (147 lb)   BMI 27.78 kg/m²     Physical Exam:  Constitutional: Alert, no distress, well-groomed.  Skin: No rashes in visible areas.  Eye: Round. Conjunctiva clear, lids normal. No icterus.   ENMT: Lips pink without lesions, good dentition, moist mucous membranes. Phonation normal.  Neck: No masses, no thyromegaly. Moves freely without pain.  Respiratory: Unlabored respiratory effort, no cough or audible wheeze  Psych: Alert and oriented x3, normal affect and mood.       Assessment and Plan:   The following treatment plan was discussed:     1. WILFREDO (obstructive sleep apnea)    2. BMI 27.0-27.9,adult    3. Nonsmoker    Continue CPAP nightly; patient continues to benefit from therapy.  DME mask/supplies - using heated tubing.  Discussed sleep hygiene; may continue OTC sleep aide use as prn.  F/u with PCP for other health concerns    Follow-up: 1 year with compliance report, sooner if needed.           "

## 2021-04-15 ENCOUNTER — HOSPITAL ENCOUNTER (OUTPATIENT)
Dept: LAB | Facility: MEDICAL CENTER | Age: 51
End: 2021-04-15
Attending: INTERNAL MEDICINE
Payer: COMMERCIAL

## 2021-04-15 LAB
ALBUMIN SERPL BCP-MCNC: 4.4 G/DL (ref 3.2–4.9)
ALBUMIN/GLOB SERPL: 1.6 G/DL
ALP SERPL-CCNC: 115 U/L (ref 30–99)
ALT SERPL-CCNC: 20 U/L (ref 2–50)
ANION GAP SERPL CALC-SCNC: 9 MMOL/L (ref 7–16)
AST SERPL-CCNC: 21 U/L (ref 12–45)
BASOPHILS # BLD AUTO: 1.3 % (ref 0–1.8)
BASOPHILS # BLD: 0.07 K/UL (ref 0–0.12)
BILIRUB SERPL-MCNC: 0.4 MG/DL (ref 0.1–1.5)
BUN SERPL-MCNC: 12 MG/DL (ref 8–22)
CALCIUM SERPL-MCNC: 9.4 MG/DL (ref 8.5–10.5)
CHLORIDE SERPL-SCNC: 103 MMOL/L (ref 96–112)
CHOLEST SERPL-MCNC: 223 MG/DL (ref 100–199)
CO2 SERPL-SCNC: 25 MMOL/L (ref 20–33)
CREAT SERPL-MCNC: 0.52 MG/DL (ref 0.5–1.4)
EOSINOPHIL # BLD AUTO: 0.09 K/UL (ref 0–0.51)
EOSINOPHIL NFR BLD: 1.7 % (ref 0–6.9)
ERYTHROCYTE [DISTWIDTH] IN BLOOD BY AUTOMATED COUNT: 42.3 FL (ref 35.9–50)
EST. AVERAGE GLUCOSE BLD GHB EST-MCNC: 111 MG/DL
GLOBULIN SER CALC-MCNC: 2.8 G/DL (ref 1.9–3.5)
GLUCOSE SERPL-MCNC: 99 MG/DL (ref 65–99)
HBA1C MFR BLD: 5.5 % (ref 4–5.6)
HCT VFR BLD AUTO: 43 % (ref 37–47)
HDLC SERPL-MCNC: 48 MG/DL
HGB BLD-MCNC: 14 G/DL (ref 12–16)
IMM GRANULOCYTES # BLD AUTO: 0.01 K/UL (ref 0–0.11)
IMM GRANULOCYTES NFR BLD AUTO: 0.2 % (ref 0–0.9)
LDLC SERPL CALC-MCNC: 162 MG/DL
LYMPHOCYTES # BLD AUTO: 2.01 K/UL (ref 1–4.8)
LYMPHOCYTES NFR BLD: 37.4 % (ref 22–41)
MCH RBC QN AUTO: 29.5 PG (ref 27–33)
MCHC RBC AUTO-ENTMCNC: 32.6 G/DL (ref 33.6–35)
MCV RBC AUTO: 90.7 FL (ref 81.4–97.8)
MONOCYTES # BLD AUTO: 0.31 K/UL (ref 0–0.85)
MONOCYTES NFR BLD AUTO: 5.8 % (ref 0–13.4)
NEUTROPHILS # BLD AUTO: 2.88 K/UL (ref 2–7.15)
NEUTROPHILS NFR BLD: 53.6 % (ref 44–72)
NRBC # BLD AUTO: 0 K/UL
NRBC BLD-RTO: 0 /100 WBC
PLATELET # BLD AUTO: 260 K/UL (ref 164–446)
PMV BLD AUTO: 12 FL (ref 9–12.9)
POTASSIUM SERPL-SCNC: 4.2 MMOL/L (ref 3.6–5.5)
PROT SERPL-MCNC: 7.2 G/DL (ref 6–8.2)
RBC # BLD AUTO: 4.74 M/UL (ref 4.2–5.4)
SODIUM SERPL-SCNC: 137 MMOL/L (ref 135–145)
T4 FREE SERPL-MCNC: 1.3 NG/DL (ref 0.93–1.7)
TRIGL SERPL-MCNC: 64 MG/DL (ref 0–149)
WBC # BLD AUTO: 5.4 K/UL (ref 4.8–10.8)

## 2021-04-15 PROCEDURE — 80061 LIPID PANEL: CPT

## 2021-04-15 PROCEDURE — 85025 COMPLETE CBC W/AUTO DIFF WBC: CPT

## 2021-04-15 PROCEDURE — 36415 COLL VENOUS BLD VENIPUNCTURE: CPT

## 2021-04-15 PROCEDURE — 80053 COMPREHEN METABOLIC PANEL: CPT

## 2021-04-15 PROCEDURE — 84439 ASSAY OF FREE THYROXINE: CPT

## 2021-04-15 PROCEDURE — 82306 VITAMIN D 25 HYDROXY: CPT

## 2021-04-15 PROCEDURE — 83036 HEMOGLOBIN GLYCOSYLATED A1C: CPT

## 2021-04-17 LAB — 25(OH)D3 SERPL-MCNC: 68 NG/ML (ref 30–80)

## 2021-11-20 ENCOUNTER — HOSPITAL ENCOUNTER (OUTPATIENT)
Dept: LAB | Facility: MEDICAL CENTER | Age: 51
End: 2021-11-20
Attending: INTERNAL MEDICINE
Payer: COMMERCIAL

## 2021-11-20 LAB
ALBUMIN SERPL BCP-MCNC: 4.6 G/DL (ref 3.2–4.9)
ALBUMIN/GLOB SERPL: 1.9 G/DL
ALP SERPL-CCNC: 101 U/L (ref 30–99)
ALT SERPL-CCNC: 20 U/L (ref 2–50)
ANION GAP SERPL CALC-SCNC: 9 MMOL/L (ref 7–16)
AST SERPL-CCNC: 23 U/L (ref 12–45)
BILIRUB SERPL-MCNC: 0.5 MG/DL (ref 0.1–1.5)
BUN SERPL-MCNC: 13 MG/DL (ref 8–22)
CALCIUM SERPL-MCNC: 9.2 MG/DL (ref 8.5–10.5)
CHLORIDE SERPL-SCNC: 100 MMOL/L (ref 96–112)
CHOLEST SERPL-MCNC: 219 MG/DL (ref 100–199)
CO2 SERPL-SCNC: 26 MMOL/L (ref 20–33)
CREAT SERPL-MCNC: 0.52 MG/DL (ref 0.5–1.4)
EST. AVERAGE GLUCOSE BLD GHB EST-MCNC: 114 MG/DL
GLOBULIN SER CALC-MCNC: 2.4 G/DL (ref 1.9–3.5)
GLUCOSE SERPL-MCNC: 101 MG/DL (ref 65–99)
HBA1C MFR BLD: 5.6 % (ref 4–5.6)
POTASSIUM SERPL-SCNC: 4.4 MMOL/L (ref 3.6–5.5)
PROT SERPL-MCNC: 7 G/DL (ref 6–8.2)
SODIUM SERPL-SCNC: 135 MMOL/L (ref 135–145)
VIT B12 SERPL-MCNC: 727 PG/ML (ref 211–911)

## 2021-11-20 PROCEDURE — 83036 HEMOGLOBIN GLYCOSYLATED A1C: CPT

## 2021-11-20 PROCEDURE — 82465 ASSAY BLD/SERUM CHOLESTEROL: CPT

## 2021-11-20 PROCEDURE — 36415 COLL VENOUS BLD VENIPUNCTURE: CPT

## 2021-11-20 PROCEDURE — 82607 VITAMIN B-12: CPT

## 2021-11-20 PROCEDURE — 80053 COMPREHEN METABOLIC PANEL: CPT

## 2022-02-04 ENCOUNTER — HOSPITAL ENCOUNTER (OUTPATIENT)
Dept: LAB | Facility: MEDICAL CENTER | Age: 52
End: 2022-02-04
Attending: INTERNAL MEDICINE
Payer: COMMERCIAL

## 2022-02-04 LAB
ALBUMIN SERPL BCP-MCNC: 4.8 G/DL (ref 3.2–4.9)
ALBUMIN/GLOB SERPL: 1.7 G/DL
ALP SERPL-CCNC: 115 U/L (ref 30–99)
ALT SERPL-CCNC: 18 U/L (ref 2–50)
ANION GAP SERPL CALC-SCNC: 11 MMOL/L (ref 7–16)
AST SERPL-CCNC: 21 U/L (ref 12–45)
BILIRUB SERPL-MCNC: 0.5 MG/DL (ref 0.1–1.5)
BUN SERPL-MCNC: 14 MG/DL (ref 8–22)
CALCIUM SERPL-MCNC: 10.1 MG/DL (ref 8.5–10.5)
CHLORIDE SERPL-SCNC: 102 MMOL/L (ref 96–112)
CO2 SERPL-SCNC: 25 MMOL/L (ref 20–33)
CREAT SERPL-MCNC: 0.59 MG/DL (ref 0.5–1.4)
CREAT UR-MCNC: 36.86 MG/DL
EST. AVERAGE GLUCOSE BLD GHB EST-MCNC: 114 MG/DL
GLOBULIN SER CALC-MCNC: 2.9 G/DL (ref 1.9–3.5)
GLUCOSE SERPL-MCNC: 91 MG/DL (ref 65–99)
HBA1C MFR BLD: 5.6 % (ref 4–5.6)
POTASSIUM SERPL-SCNC: 4.7 MMOL/L (ref 3.6–5.5)
PROT SERPL-MCNC: 7.7 G/DL (ref 6–8.2)
PROT UR-MCNC: <4 MG/DL (ref 0–15)
PROT/CREAT UR: NORMAL MG/G (ref 10–107)
SODIUM SERPL-SCNC: 138 MMOL/L (ref 135–145)
VIT B12 SERPL-MCNC: 668 PG/ML (ref 211–911)

## 2022-02-04 PROCEDURE — 80053 COMPREHEN METABOLIC PANEL: CPT

## 2022-02-04 PROCEDURE — 84156 ASSAY OF PROTEIN URINE: CPT

## 2022-02-04 PROCEDURE — 83036 HEMOGLOBIN GLYCOSYLATED A1C: CPT

## 2022-02-04 PROCEDURE — 36415 COLL VENOUS BLD VENIPUNCTURE: CPT

## 2022-02-04 PROCEDURE — 82607 VITAMIN B-12: CPT

## 2022-02-04 PROCEDURE — 82570 ASSAY OF URINE CREATININE: CPT

## 2022-02-28 ENCOUNTER — TELEPHONE (OUTPATIENT)
Dept: SCHEDULING | Facility: IMAGING CENTER | Age: 52
End: 2022-02-28

## 2022-02-28 ENCOUNTER — TELEMEDICINE (OUTPATIENT)
Dept: SLEEP MEDICINE | Facility: MEDICAL CENTER | Age: 52
End: 2022-02-28
Payer: COMMERCIAL

## 2022-02-28 VITALS — BODY MASS INDEX: 28.32 KG/M2 | HEIGHT: 61 IN | WEIGHT: 150 LBS

## 2022-02-28 DIAGNOSIS — G47.33 OSA (OBSTRUCTIVE SLEEP APNEA): Chronic | ICD-10-CM

## 2022-02-28 DIAGNOSIS — Z78.9 NONSMOKER: ICD-10-CM

## 2022-02-28 PROCEDURE — 99212 OFFICE O/P EST SF 10 MIN: CPT | Mod: 95 | Performed by: NURSE PRACTITIONER

## 2022-02-28 ASSESSMENT — FIBROSIS 4 INDEX: FIB4 SCORE: 0.93

## 2022-02-28 ASSESSMENT — PATIENT HEALTH QUESTIONNAIRE - PHQ9: CLINICAL INTERPRETATION OF PHQ2 SCORE: 0

## 2022-02-28 NOTE — PROGRESS NOTES
Virtual Visit: Established Patient   This visit was conducted via Zoom using secure and encrypted videoconferencing technology.   The patient was in their home in the state [facility].    The patient's identity was confirmed and verbal consent was obtained for this virtual visit.     Subjective:   CC:   Chief Complaint   Patient presents with   • Apnea     last seen 12/7/2020       Mirtha Han is a 51 y.o. female presenting for evaluation and management of:    WILFREDO  Last OV 12/7/20    Currently using [employer] CPAP 11cm; device obtained 2019. Device is registered for recall and she denies any acute respiratory symptoms or black debris with use.  Compliance report 1/29/22-2/27/22 indicates 90% compliance, avg nightly use of 4hr 19min, no significant mask leak with reduced AHI 3.2/hr. She tolerates mask and pressure. She denies AM headaches. Energy levels are consistent and no napping. She generally sleeps 4-5hrs on device. She denies any significant changes in health over the last year but does have a lingering cough and PCP following. She denies cardiac or other respiratory symptoms today. She will continue to benefit from PAP therapy.    Sleep hx:  PSG split-night February 2014 indicates severe WILFREDO with an AHI 35.2/h and a nydia of 83%.  Best pressure tolerated CPAP 7 cm nightly with a reduced AHI of 2.2/h and a nydia of 95%.  She underwent UPPP.  She was evaluated by [facility] for dental appliance with felt not to be the best candidate and urged to use CPAP.    ROS   In HPI; otherwise negative    Current medicines (including changes today)  Current Outpatient Medications   Medication Sig Dispense Refill   • Cholecalciferol (VITAMIN D PO) Take  by mouth.     • topiramate (TOPAMAX) 25 MG Tab Take 25 mg by mouth as needed.     • PROPRANOLOL HCL 2 x day for migraines       No current facility-administered medications for this visit.       Patient Active Problem List    Diagnosis Date Noted   • Nonsmoker  "06/03/2019   • History of migraine 06/03/2019   • Chronic dryness of both eyes 06/03/2019   • WILFREDO (obstructive sleep apnea) 09/12/2018   • Deviated nasal septum 03/21/2016   • Migraine headache         Objective:   Ht 1.549 m (5' 1\")   Wt 68 kg (150 lb)   BMI 28.34 kg/m²     Physical Exam:  Constitutional: Alert, no distress, well-groomed.  Skin: No rashes in visible areas.  Eye: Round. Conjunctiva clear, lids normal. No icterus. Glasses.  ENMT: Lips pink without lesions, good dentition, moist mucous membranes. Phonation normal.  Neck: No masses, no thyromegaly. Moves freely without pain.  Respiratory: Unlabored respiratory effort, no cough or audible wheeze  Psych: Alert and oriented x3, normal affect and mood.     Assessment and Plan:   The following treatment plan was discussed:     1. WILFREDO (obstructive sleep apnea)  - DME Mask and Supplies    2. BMI 28.0-28.9,adult    3. Nonsmoker    Sleep apnea improved on therapy. Continue CPAP nightly.  She will continue to benefit from therapy.  DME mask/supplies  Discussed sleep hygiene and need for consistent nightly use  Follow up with PCP for other health concerns    Follow-up: Return in about 1 year (around 2/28/2023) for Re OTERO, AT SLEEP, SHORT follow up, Sooner if needed., With Compliance Card.           "

## 2022-02-28 NOTE — TELEPHONE ENCOUNTER
Hello,    Pt called in because she had schedule her appt for today 2/28/22 @12:40 as a virtual visit but her MYCHART showed it as an in person appt. I swtiched it over to a virtual visit when she called but noticed the notes already said it is virtual. If you have any questions please call her at [phone].    Thank you,  Chantale VICKERS

## 2022-07-12 ENCOUNTER — HOSPITAL ENCOUNTER (OUTPATIENT)
Dept: LAB | Facility: MEDICAL CENTER | Age: 52
End: 2022-07-12
Attending: INTERNAL MEDICINE
Payer: COMMERCIAL

## 2022-07-12 LAB
ALBUMIN SERPL BCP-MCNC: 4.6 G/DL (ref 3.2–4.9)
ALBUMIN/GLOB SERPL: 1.6 G/DL
ALP SERPL-CCNC: 108 U/L (ref 30–99)
ALT SERPL-CCNC: 15 U/L (ref 2–50)
ANION GAP SERPL CALC-SCNC: 10 MMOL/L (ref 7–16)
AST SERPL-CCNC: 17 U/L (ref 12–45)
BASOPHILS # BLD AUTO: 1.2 % (ref 0–1.8)
BASOPHILS # BLD: 0.06 K/UL (ref 0–0.12)
BILIRUB SERPL-MCNC: 0.3 MG/DL (ref 0.1–1.5)
BUN SERPL-MCNC: 13 MG/DL (ref 8–22)
CALCIUM SERPL-MCNC: 9.5 MG/DL (ref 8.5–10.5)
CHLORIDE SERPL-SCNC: 103 MMOL/L (ref 96–112)
CHOLEST SERPL-MCNC: 176 MG/DL (ref 100–199)
CO2 SERPL-SCNC: 22 MMOL/L (ref 20–33)
CREAT SERPL-MCNC: 0.64 MG/DL (ref 0.5–1.4)
EOSINOPHIL # BLD AUTO: 0.09 K/UL (ref 0–0.51)
EOSINOPHIL NFR BLD: 1.9 % (ref 0–6.9)
ERYTHROCYTE [DISTWIDTH] IN BLOOD BY AUTOMATED COUNT: 42.5 FL (ref 35.9–50)
EST. AVERAGE GLUCOSE BLD GHB EST-MCNC: 114 MG/DL
FASTING STATUS PATIENT QL REPORTED: NORMAL
GFR SERPLBLD CREATININE-BSD FMLA CKD-EPI: 108 ML/MIN/1.73 M 2
GLOBULIN SER CALC-MCNC: 2.8 G/DL (ref 1.9–3.5)
GLUCOSE SERPL-MCNC: 98 MG/DL (ref 65–99)
HBA1C MFR BLD: 5.6 % (ref 4–5.6)
HCT VFR BLD AUTO: 42.3 % (ref 37–47)
HDLC SERPL-MCNC: 42 MG/DL
HGB BLD-MCNC: 14.2 G/DL (ref 12–16)
IMM GRANULOCYTES # BLD AUTO: 0.01 K/UL (ref 0–0.11)
IMM GRANULOCYTES NFR BLD AUTO: 0.2 % (ref 0–0.9)
LDLC SERPL CALC-MCNC: 122 MG/DL
LYMPHOCYTES # BLD AUTO: 1.59 K/UL (ref 1–4.8)
LYMPHOCYTES NFR BLD: 32.7 % (ref 22–41)
MCH RBC QN AUTO: 29.5 PG (ref 27–33)
MCHC RBC AUTO-ENTMCNC: 33.6 G/DL (ref 33.6–35)
MCV RBC AUTO: 87.8 FL (ref 81.4–97.8)
MONOCYTES # BLD AUTO: 0.37 K/UL (ref 0–0.85)
MONOCYTES NFR BLD AUTO: 7.6 % (ref 0–13.4)
NEUTROPHILS # BLD AUTO: 2.74 K/UL (ref 2–7.15)
NEUTROPHILS NFR BLD: 56.4 % (ref 44–72)
NRBC # BLD AUTO: 0 K/UL
NRBC BLD-RTO: 0 /100 WBC
PLATELET # BLD AUTO: 252 K/UL (ref 164–446)
PMV BLD AUTO: 11.1 FL (ref 9–12.9)
POTASSIUM SERPL-SCNC: 4.4 MMOL/L (ref 3.6–5.5)
PROT SERPL-MCNC: 7.4 G/DL (ref 6–8.2)
RBC # BLD AUTO: 4.82 M/UL (ref 4.2–5.4)
SODIUM SERPL-SCNC: 135 MMOL/L (ref 135–145)
T4 FREE SERPL-MCNC: 1.19 NG/DL (ref 0.93–1.7)
TRIGL SERPL-MCNC: 62 MG/DL (ref 0–149)
TSH SERPL DL<=0.005 MIU/L-ACNC: 0.98 UIU/ML (ref 0.38–5.33)
WBC # BLD AUTO: 4.9 K/UL (ref 4.8–10.8)

## 2022-07-12 PROCEDURE — 36415 COLL VENOUS BLD VENIPUNCTURE: CPT

## 2022-07-12 PROCEDURE — 84439 ASSAY OF FREE THYROXINE: CPT

## 2022-07-12 PROCEDURE — 83036 HEMOGLOBIN GLYCOSYLATED A1C: CPT

## 2022-07-12 PROCEDURE — 84443 ASSAY THYROID STIM HORMONE: CPT

## 2022-07-12 PROCEDURE — 80061 LIPID PANEL: CPT

## 2022-07-12 PROCEDURE — 80053 COMPREHEN METABOLIC PANEL: CPT

## 2022-07-12 PROCEDURE — 85025 COMPLETE CBC W/AUTO DIFF WBC: CPT

## 2022-09-02 ENCOUNTER — HOSPITAL ENCOUNTER (OUTPATIENT)
Dept: LAB | Facility: MEDICAL CENTER | Age: 52
End: 2022-09-02
Attending: INTERNAL MEDICINE
Payer: COMMERCIAL

## 2022-09-02 LAB
25(OH)D3 SERPL-MCNC: 34 NG/ML (ref 30–100)
CREAT UR-MCNC: 126.53 MG/DL
MICROALBUMIN UR-MCNC: <1.2 MG/DL
MICROALBUMIN/CREAT UR: NORMAL MG/G (ref 0–30)

## 2022-09-02 PROCEDURE — 82043 UR ALBUMIN QUANTITATIVE: CPT

## 2022-09-02 PROCEDURE — 36415 COLL VENOUS BLD VENIPUNCTURE: CPT

## 2022-09-02 PROCEDURE — 82570 ASSAY OF URINE CREATININE: CPT

## 2022-09-02 PROCEDURE — 82306 VITAMIN D 25 HYDROXY: CPT

## 2022-12-28 ENCOUNTER — APPOINTMENT (RX ONLY)
Dept: URBAN - METROPOLITAN AREA CLINIC 4 | Facility: CLINIC | Age: 52
Setting detail: DERMATOLOGY
End: 2022-12-28

## 2022-12-28 DIAGNOSIS — L65.8 OTHER SPECIFIED NONSCARRING HAIR LOSS: ICD-10-CM

## 2022-12-28 PROCEDURE — ? COUNSELING

## 2022-12-28 PROCEDURE — ? ADDITIONAL NOTES

## 2022-12-28 PROCEDURE — 99203 OFFICE O/P NEW LOW 30 MIN: CPT

## 2022-12-28 ASSESSMENT — LOCATION ZONE DERM
LOCATION ZONE: SCALP
LOCATION ZONE: FACE

## 2022-12-28 ASSESSMENT — LOCATION SIMPLE DESCRIPTION DERM
LOCATION SIMPLE: RIGHT SCALP
LOCATION SIMPLE: CHIN

## 2022-12-28 ASSESSMENT — LOCATION DETAILED DESCRIPTION DERM
LOCATION DETAILED: RIGHT MEDIAL FRONTAL SCALP
LOCATION DETAILED: RIGHT CHIN

## 2022-12-28 NOTE — PROCEDURE: ADDITIONAL NOTES
Detail Level: Detailed
Render Risk Assessment In Note?: no
Additional Notes: Recommend minoxidil shampoo OTC, light hats, PRP.

## 2023-04-01 ENCOUNTER — HOSPITAL ENCOUNTER (OUTPATIENT)
Dept: LAB | Facility: MEDICAL CENTER | Age: 53
End: 2023-04-01
Attending: INTERNAL MEDICINE
Payer: COMMERCIAL

## 2023-04-01 LAB
ALBUMIN SERPL BCP-MCNC: 4.4 G/DL (ref 3.2–4.9)
ALBUMIN/GLOB SERPL: 1.8 G/DL
ALP SERPL-CCNC: 112 U/L (ref 30–99)
ALT SERPL-CCNC: 19 U/L (ref 2–50)
ANION GAP SERPL CALC-SCNC: 9 MMOL/L (ref 7–16)
AST SERPL-CCNC: 19 U/L (ref 12–45)
BILIRUB SERPL-MCNC: 0.3 MG/DL (ref 0.1–1.5)
BUN SERPL-MCNC: 14 MG/DL (ref 8–22)
CALCIUM ALBUM COR SERPL-MCNC: 8.9 MG/DL (ref 8.5–10.5)
CALCIUM SERPL-MCNC: 9.2 MG/DL (ref 8.5–10.5)
CHLORIDE SERPL-SCNC: 104 MMOL/L (ref 96–112)
CO2 SERPL-SCNC: 26 MMOL/L (ref 20–33)
CREAT SERPL-MCNC: 0.59 MG/DL (ref 0.5–1.4)
CREAT UR-MCNC: 87.66 MG/DL
FASTING STATUS PATIENT QL REPORTED: NORMAL
GFR SERPLBLD CREATININE-BSD FMLA CKD-EPI: 109 ML/MIN/1.73 M 2
GLOBULIN SER CALC-MCNC: 2.4 G/DL (ref 1.9–3.5)
GLUCOSE SERPL-MCNC: 109 MG/DL (ref 65–99)
MICROALBUMIN UR-MCNC: <1.2 MG/DL
MICROALBUMIN/CREAT UR: NORMAL MG/G (ref 0–30)
POTASSIUM SERPL-SCNC: 5.1 MMOL/L (ref 3.6–5.5)
PROT SERPL-MCNC: 6.8 G/DL (ref 6–8.2)
SODIUM SERPL-SCNC: 139 MMOL/L (ref 135–145)
T4 FREE SERPL-MCNC: 1.27 NG/DL (ref 0.93–1.7)
TSH SERPL DL<=0.005 MIU/L-ACNC: 1.08 UIU/ML (ref 0.38–5.33)
VIT B12 SERPL-MCNC: 736 PG/ML (ref 211–911)

## 2023-04-01 PROCEDURE — 82043 UR ALBUMIN QUANTITATIVE: CPT

## 2023-04-01 PROCEDURE — 82570 ASSAY OF URINE CREATININE: CPT

## 2023-04-01 PROCEDURE — 36415 COLL VENOUS BLD VENIPUNCTURE: CPT

## 2023-04-01 PROCEDURE — 80053 COMPREHEN METABOLIC PANEL: CPT

## 2023-04-01 PROCEDURE — 84439 ASSAY OF FREE THYROXINE: CPT

## 2023-04-01 PROCEDURE — 82607 VITAMIN B-12: CPT

## 2023-04-01 PROCEDURE — 84443 ASSAY THYROID STIM HORMONE: CPT

## 2023-09-07 ENCOUNTER — HOSPITAL ENCOUNTER (OUTPATIENT)
Dept: LAB | Facility: MEDICAL CENTER | Age: 53
End: 2023-09-07
Attending: INTERNAL MEDICINE
Payer: COMMERCIAL

## 2023-09-07 LAB
BASOPHILS # BLD AUTO: 0.8 % (ref 0–1.8)
BASOPHILS # BLD: 0.04 K/UL (ref 0–0.12)
CHOLEST SERPL-MCNC: 197 MG/DL (ref 100–199)
EOSINOPHIL # BLD AUTO: 0.07 K/UL (ref 0–0.51)
EOSINOPHIL NFR BLD: 1.4 % (ref 0–6.9)
ERYTHROCYTE [DISTWIDTH] IN BLOOD BY AUTOMATED COUNT: 43.4 FL (ref 35.9–50)
FASTING STATUS PATIENT QL REPORTED: NORMAL
HCT VFR BLD AUTO: 42 % (ref 37–47)
HDLC SERPL-MCNC: 48 MG/DL
HGB BLD-MCNC: 13.9 G/DL (ref 12–16)
IMM GRANULOCYTES # BLD AUTO: 0.01 K/UL (ref 0–0.11)
IMM GRANULOCYTES NFR BLD AUTO: 0.2 % (ref 0–0.9)
LDLC SERPL CALC-MCNC: 124 MG/DL
LYMPHOCYTES # BLD AUTO: 1.87 K/UL (ref 1–4.8)
LYMPHOCYTES NFR BLD: 38.3 % (ref 22–41)
MCH RBC QN AUTO: 29.8 PG (ref 27–33)
MCHC RBC AUTO-ENTMCNC: 33.1 G/DL (ref 32.2–35.5)
MCV RBC AUTO: 90.1 FL (ref 81.4–97.8)
MONOCYTES # BLD AUTO: 0.38 K/UL (ref 0–0.85)
MONOCYTES NFR BLD AUTO: 7.8 % (ref 0–13.4)
NEUTROPHILS # BLD AUTO: 2.51 K/UL (ref 1.82–7.42)
NEUTROPHILS NFR BLD: 51.5 % (ref 44–72)
NRBC # BLD AUTO: 0 K/UL
NRBC BLD-RTO: 0 /100 WBC (ref 0–0.2)
PLATELET # BLD AUTO: 231 K/UL (ref 164–446)
PMV BLD AUTO: 11.3 FL (ref 9–12.9)
RBC # BLD AUTO: 4.66 M/UL (ref 4.2–5.4)
TRIGL SERPL-MCNC: 126 MG/DL (ref 0–149)
WBC # BLD AUTO: 4.9 K/UL (ref 4.8–10.8)

## 2023-09-07 PROCEDURE — 85025 COMPLETE CBC W/AUTO DIFF WBC: CPT

## 2023-09-07 PROCEDURE — 36415 COLL VENOUS BLD VENIPUNCTURE: CPT

## 2023-09-07 PROCEDURE — 80061 LIPID PANEL: CPT

## 2023-09-21 ENCOUNTER — HOSPITAL ENCOUNTER (OUTPATIENT)
Dept: RADIOLOGY | Facility: MEDICAL CENTER | Age: 53
End: 2023-09-21
Payer: COMMERCIAL

## 2023-09-26 ENCOUNTER — HOSPITAL ENCOUNTER (OUTPATIENT)
Dept: RADIOLOGY | Facility: MEDICAL CENTER | Age: 53
End: 2023-09-26
Payer: COMMERCIAL

## 2023-10-04 ENCOUNTER — HOSPITAL ENCOUNTER (OUTPATIENT)
Dept: RADIOLOGY | Facility: MEDICAL CENTER | Age: 53
End: 2023-10-04
Attending: INTERNAL MEDICINE
Payer: COMMERCIAL

## 2023-10-04 DIAGNOSIS — Z12.31 ENCOUNTER FOR SCREENING MAMMOGRAM FOR MALIGNANT NEOPLASM OF BREAST: ICD-10-CM

## 2023-10-04 PROCEDURE — 77063 BREAST TOMOSYNTHESIS BI: CPT

## 2024-04-06 ENCOUNTER — HOSPITAL ENCOUNTER (OUTPATIENT)
Dept: LAB | Facility: MEDICAL CENTER | Age: 54
End: 2024-04-06
Attending: INTERNAL MEDICINE
Payer: COMMERCIAL

## 2024-04-06 LAB
25(OH)D3 SERPL-MCNC: 82 NG/ML (ref 30–100)
ALBUMIN SERPL BCP-MCNC: 4.6 G/DL (ref 3.2–4.9)
ALBUMIN/GLOB SERPL: 1.6 G/DL
ALP SERPL-CCNC: 116 U/L (ref 30–99)
ALT SERPL-CCNC: 20 U/L (ref 2–50)
ANION GAP SERPL CALC-SCNC: 13 MMOL/L (ref 7–16)
AST SERPL-CCNC: 25 U/L (ref 12–45)
BILIRUB SERPL-MCNC: 0.4 MG/DL (ref 0.1–1.5)
BUN SERPL-MCNC: 15 MG/DL (ref 8–22)
CALCIUM ALBUM COR SERPL-MCNC: 9.4 MG/DL (ref 8.5–10.5)
CALCIUM SERPL-MCNC: 9.9 MG/DL (ref 8.5–10.5)
CHLORIDE SERPL-SCNC: 104 MMOL/L (ref 96–112)
CO2 SERPL-SCNC: 22 MMOL/L (ref 20–33)
CREAT SERPL-MCNC: 0.71 MG/DL (ref 0.5–1.4)
EST. AVERAGE GLUCOSE BLD GHB EST-MCNC: 117 MG/DL
GFR SERPLBLD CREATININE-BSD FMLA CKD-EPI: 103 ML/MIN/1.73 M 2
GLOBULIN SER CALC-MCNC: 2.9 G/DL (ref 1.9–3.5)
GLUCOSE SERPL-MCNC: 90 MG/DL (ref 65–99)
HBA1C MFR BLD: 5.7 % (ref 4–5.6)
POTASSIUM SERPL-SCNC: 5.1 MMOL/L (ref 3.6–5.5)
PROT SERPL-MCNC: 7.5 G/DL (ref 6–8.2)
SODIUM SERPL-SCNC: 139 MMOL/L (ref 135–145)

## 2024-04-06 PROCEDURE — 80053 COMPREHEN METABOLIC PANEL: CPT

## 2024-04-06 PROCEDURE — 83036 HEMOGLOBIN GLYCOSYLATED A1C: CPT

## 2024-04-06 PROCEDURE — 82306 VITAMIN D 25 HYDROXY: CPT

## 2024-04-06 PROCEDURE — 36415 COLL VENOUS BLD VENIPUNCTURE: CPT

## 2024-04-18 ENCOUNTER — HOSPITAL ENCOUNTER (OUTPATIENT)
Facility: MEDICAL CENTER | Age: 54
End: 2024-04-18
Attending: INTERNAL MEDICINE
Payer: COMMERCIAL

## 2024-04-18 PROCEDURE — 82043 UR ALBUMIN QUANTITATIVE: CPT

## 2024-04-18 PROCEDURE — 82570 ASSAY OF URINE CREATININE: CPT

## 2024-04-19 ENCOUNTER — HOSPITAL ENCOUNTER (OUTPATIENT)
Dept: RADIOLOGY | Facility: MEDICAL CENTER | Age: 54
End: 2024-04-19
Attending: OBSTETRICS & GYNECOLOGY
Payer: COMMERCIAL

## 2024-04-19 DIAGNOSIS — M85.80 OSTEOPENIA, UNSPECIFIED LOCATION: ICD-10-CM

## 2024-04-19 LAB
CREAT UR-MCNC: 29.48 MG/DL
MICROALBUMIN UR-MCNC: <1.2 MG/DL
MICROALBUMIN/CREAT UR: NORMAL MG/G (ref 0–30)

## 2024-04-19 PROCEDURE — 77080 DXA BONE DENSITY AXIAL: CPT

## 2024-08-14 ENCOUNTER — HOSPITAL ENCOUNTER (OUTPATIENT)
Dept: LAB | Facility: MEDICAL CENTER | Age: 54
End: 2024-08-14
Attending: INTERNAL MEDICINE
Payer: COMMERCIAL

## 2024-08-14 LAB
25(OH)D3 SERPL-MCNC: 110 NG/ML (ref 30–100)
ALBUMIN SERPL BCP-MCNC: 4.3 G/DL (ref 3.2–4.9)
ALBUMIN/GLOB SERPL: 1.8 G/DL
ALP SERPL-CCNC: 86 U/L (ref 30–99)
ALT SERPL-CCNC: 27 U/L (ref 2–50)
ANION GAP SERPL CALC-SCNC: 12 MMOL/L (ref 7–16)
AST SERPL-CCNC: 27 U/L (ref 12–45)
BASOPHILS # BLD AUTO: 0.8 % (ref 0–1.8)
BASOPHILS # BLD: 0.05 K/UL (ref 0–0.12)
BILIRUB SERPL-MCNC: 0.5 MG/DL (ref 0.1–1.5)
BUN SERPL-MCNC: 15 MG/DL (ref 8–22)
CALCIUM ALBUM COR SERPL-MCNC: 9.6 MG/DL (ref 8.5–10.5)
CALCIUM SERPL-MCNC: 9.8 MG/DL (ref 8.5–10.5)
CHLORIDE SERPL-SCNC: 106 MMOL/L (ref 96–112)
CO2 SERPL-SCNC: 23 MMOL/L (ref 20–33)
CREAT SERPL-MCNC: 0.65 MG/DL (ref 0.5–1.4)
EOSINOPHIL # BLD AUTO: 0.09 K/UL (ref 0–0.51)
EOSINOPHIL NFR BLD: 1.4 % (ref 0–6.9)
ERYTHROCYTE [DISTWIDTH] IN BLOOD BY AUTOMATED COUNT: 43.6 FL (ref 35.9–50)
EST. AVERAGE GLUCOSE BLD GHB EST-MCNC: 111 MG/DL
GFR SERPLBLD CREATININE-BSD FMLA CKD-EPI: 106 ML/MIN/1.73 M 2
GLOBULIN SER CALC-MCNC: 2.4 G/DL (ref 1.9–3.5)
GLUCOSE SERPL-MCNC: 104 MG/DL (ref 65–99)
HBA1C MFR BLD: 5.5 % (ref 4–5.6)
HCT VFR BLD AUTO: 44.2 % (ref 37–47)
HGB BLD-MCNC: 14.6 G/DL (ref 12–16)
IMM GRANULOCYTES # BLD AUTO: 0.02 K/UL (ref 0–0.11)
IMM GRANULOCYTES NFR BLD AUTO: 0.3 % (ref 0–0.9)
LYMPHOCYTES # BLD AUTO: 1.76 K/UL (ref 1–4.8)
LYMPHOCYTES NFR BLD: 28 % (ref 22–41)
MCH RBC QN AUTO: 30 PG (ref 27–33)
MCHC RBC AUTO-ENTMCNC: 33 G/DL (ref 32.2–35.5)
MCV RBC AUTO: 90.9 FL (ref 81.4–97.8)
MONOCYTES # BLD AUTO: 0.52 K/UL (ref 0–0.85)
MONOCYTES NFR BLD AUTO: 8.3 % (ref 0–13.4)
NEUTROPHILS # BLD AUTO: 3.85 K/UL (ref 1.82–7.42)
NEUTROPHILS NFR BLD: 61.2 % (ref 44–72)
NRBC # BLD AUTO: 0 K/UL
NRBC BLD-RTO: 0 /100 WBC (ref 0–0.2)
PLATELET # BLD AUTO: 246 K/UL (ref 164–446)
PMV BLD AUTO: 12.1 FL (ref 9–12.9)
POTASSIUM SERPL-SCNC: 4.9 MMOL/L (ref 3.6–5.5)
PROT SERPL-MCNC: 6.7 G/DL (ref 6–8.2)
RBC # BLD AUTO: 4.86 M/UL (ref 4.2–5.4)
SODIUM SERPL-SCNC: 141 MMOL/L (ref 135–145)
T4 FREE SERPL-MCNC: 1.4 NG/DL (ref 0.93–1.7)
TSH SERPL-ACNC: 1.53 UIU/ML (ref 0.35–5.5)
WBC # BLD AUTO: 6.3 K/UL (ref 4.8–10.8)

## 2024-08-14 PROCEDURE — 84443 ASSAY THYROID STIM HORMONE: CPT

## 2024-08-14 PROCEDURE — 80053 COMPREHEN METABOLIC PANEL: CPT

## 2024-08-14 PROCEDURE — 82043 UR ALBUMIN QUANTITATIVE: CPT

## 2024-08-14 PROCEDURE — 84439 ASSAY OF FREE THYROXINE: CPT

## 2024-08-14 PROCEDURE — 36415 COLL VENOUS BLD VENIPUNCTURE: CPT

## 2024-08-14 PROCEDURE — 82306 VITAMIN D 25 HYDROXY: CPT

## 2024-08-14 PROCEDURE — 85025 COMPLETE CBC W/AUTO DIFF WBC: CPT

## 2024-08-14 PROCEDURE — 82570 ASSAY OF URINE CREATININE: CPT

## 2024-08-14 PROCEDURE — 83036 HEMOGLOBIN GLYCOSYLATED A1C: CPT

## 2024-08-16 LAB
COLLECT DURATION TIME SPEC: NORMAL HRS
CREAT 24H UR-MCNC: 99 MG/DL
MICROALBUMIN 24H UR-MCNC: 0.6 MG/DL
MICROALBUMIN/CREAT 24H UR: 6 MG/G (ref 0–30)
SPECIMEN VOL ?TM UR: NORMAL ML

## 2025-01-16 ENCOUNTER — OFFICE VISIT (OUTPATIENT)
Dept: SLEEP MEDICINE | Facility: MEDICAL CENTER | Age: 55
End: 2025-01-16
Attending: PHYSICIAN ASSISTANT
Payer: COMMERCIAL

## 2025-01-16 VITALS
DIASTOLIC BLOOD PRESSURE: 74 MMHG | HEIGHT: 61 IN | BODY MASS INDEX: 26.43 KG/M2 | SYSTOLIC BLOOD PRESSURE: 122 MMHG | RESPIRATION RATE: 16 BRPM | OXYGEN SATURATION: 96 % | WEIGHT: 140 LBS | HEART RATE: 59 BPM

## 2025-01-16 DIAGNOSIS — G47.33 OSA (OBSTRUCTIVE SLEEP APNEA): ICD-10-CM

## 2025-01-16 PROCEDURE — 3078F DIAST BP <80 MM HG: CPT | Performed by: PHYSICIAN ASSISTANT

## 2025-01-16 PROCEDURE — 99213 OFFICE O/P EST LOW 20 MIN: CPT | Performed by: PHYSICIAN ASSISTANT

## 2025-01-16 PROCEDURE — 3074F SYST BP LT 130 MM HG: CPT | Performed by: PHYSICIAN ASSISTANT

## 2025-01-16 ASSESSMENT — ENCOUNTER SYMPTOMS
ORTHOPNEA: 0
HEADACHES: 1
INSOMNIA: 0
SPUTUM PRODUCTION: 0
SINUS PAIN: 0
SORE THROAT: 0
SHORTNESS OF BREATH: 0
HEARTBURN: 1
WHEEZING: 0
CHILLS: 0
COUGH: 0
WEIGHT LOSS: 0
DIZZINESS: 0
ROS GI COMMENTS: NO DENTURES, NO MISSING TEETH OR SWALLOWING ISSUES
FEVER: 0
TREMORS: 0
PALPITATIONS: 0

## 2025-01-16 ASSESSMENT — FIBROSIS 4 INDEX: FIB4 SCORE: 1.1

## 2025-01-17 NOTE — PROGRESS NOTES
"Chief Complaint   Patient presents with    Apnea     Last Office Visit 2/28/22 with YUMIKO Garduno.    PAP/O2/OAT: CPAP 11 CM        HPI:  Mirtha Han is a 54 y.o. year old female here today for follow-up on obstructive sleep apnea.  Last seen via telemedicine on 2/28/2022 by BRANDEN Morrow.  Previously evaluated 12/20/2018 by Dr. Bhupinder Jarquin.  Patient reports her CPAP device, which is greater than 5 years old, is not functioning properly and she is requesting replacement.    Past Medical History: WILFREDO on CPAP, deviated nasal septum, chronic eye dryness, migraines, UPPP surgery.    Vitals:  /74 (BP Location: Left arm, Patient Position: Sitting, BP Cuff Size: Adult)   Pulse (!) 59   Resp 16   Ht 1.549 m (5' 1\")   Wt 63.5 kg (140 lb)   SpO2 96% BMI of 26.45 kg/m².    Recent Imaging: None    Currently using  [facility]  CPAP @11 cm H20 pressure; compliance reviewed for 12/17/2024 through 1/13/2025, days used 29/30, average daily usage 7 hours 25 minutes, 96.7% of days greater than or equal to 4 hours, mask leak at 5 minutes, AHI 3.3 per hour.  See media for full report.    Device obtained 2019  DME provider [facility]  Mask interface nasal cushion    Polysomnogram split-night study obtained in February 2014 with findings consistent with severe obstructive sleep apnea with overall AHI of 35.2 events per hour and low O2 sat of 83%.  Patient tolerated CPAP of 7 cm H2O pressure best.    Patient is post UPPP surgery, was evaluated for dental appliance but felt not to be the best candidate and urged to use CPAP therapy.    Sleep schedule goes to bed 9:30 PM, wakens 5 AM , and gets up during the night 0-1 times bathroom   Symptoms denies day time somnolence, denies morning headache, and fatigue if she is not using her CPAP device    [place] Sleepiness Scale reported as 12/24 on 9/12/2018      Review of Systems   Constitutional:  Negative for chills, fever, malaise/fatigue and weight " loss.   HENT:  Positive for hearing loss (mild right). Negative for congestion, nosebleeds, sinus pain, sore throat and tinnitus.    Eyes:         Presc readers   Respiratory:  Negative for cough, sputum production, shortness of breath and wheezing.    Cardiovascular:  Negative for chest pain, palpitations, orthopnea and leg swelling.   Gastrointestinal:  Positive for heartburn (occasional).        No dentures, no missing teeth or swallowing issues    Neurological:  Positive for headaches (occasional migraines). Negative for dizziness and tremors.   Psychiatric/Behavioral:  The patient does not have insomnia.        Past Medical History:   Diagnosis Date    Chickenpox     Migraine headache     Pap smear     Dr. Gemini Olmstead    Sleep apnea     CPAP use    Snoring     Stable burst fracture of first lumbar vertebra (HCC) 5/01       Past Surgical History:   Procedure Laterality Date    SEPTOPLASTY N/A 3/21/2016    Procedure: SEPTOPLASTY;  Surgeon: DELMA Rodriguez M.D.;  Location: SURGERY SAME DAY [facility];  Service:     TONSILLECTOMY N/A 3/21/2016    Procedure: TONSILLECTOMY;  Surgeon: DELMA Rodriguez M.D.;  Location: SURGERY SAME DAY [facility];  Service:     SEPTAL RECONSTRUCTION N/A 3/21/2016    Procedure: SEPTAL RECONSTRUCTION with  grafts ;  Surgeon: DELMA Rodriguez M.D.;  Location: SURGERY SAME DAY [facility];  Service:     TURBINOPLASTY N/A 3/21/2016    Procedure: TURBINOPLASTY;  Surgeon: DELMA Rodriguez M.D.;  Location: SURGERY SAME DAY [facility];  Service:     TUBAL COAGULATION LAPAROSCOPIC BILATERAL  6/9/2010    Performed by GEMINI OLMSTEAD at SURGERY SAME DAY [facility]    OTHER ORTHOPEDIC SURGERY  1989    right foot surgery - bone removed       Family History   Problem Relation Age of Onset    Diabetes Father     Hypertension Mother     Sleep Apnea Neg Hx        Social History     Socioeconomic History    Marital status:      Spouse name: Not on file    Number of  children: Not on file    Years of education: Not on file    Highest education level: Not on file   Occupational History    Occupation: office work     Employer: [employer]   Tobacco Use    Smoking status: Never    Smokeless tobacco: Never   Vaping Use    Vaping status: Never Used   Substance and Sexual Activity    Alcohol use: Yes     Comment: 4 per month    Drug use: No    Sexual activity: Yes     Birth control/protection: Pill   Other Topics Concern    Not on file   Social History Narrative    Not on file     Social Drivers of Health     Financial Resource Strain: Not on file   Food Insecurity: Not on file   Transportation Needs: Not on file   Physical Activity: Not on file   Stress: Not on file   Social Connections: Not on file   Intimate Partner Violence: Not on file   Housing Stability: Not on file       Allergies as of 01/16/2025 - Reviewed 01/16/2025   Allergen Reaction Noted    Nkda [no known drug allergy]  06/03/2010          Current medications as of today   Current Outpatient Medications   Medication Sig Dispense Refill    Cholecalciferol (VITAMIN D PO) Take  by mouth.      topiramate (TOPAMAX) 25 MG Tab Take 25 mg by mouth as needed.      PROPRANOLOL HCL 2 x day for migraines       No current facility-administered medications for this visit.         Physical Exam:   Gen:           Alert and oriented, No apparent distress. Mood and affect appropriate, normal interaction with examiner.   Hearing:     Grossly intact.  Nose:          Normal, no lesions or deformities.  Dentition:    Good dentition.   Oropharynx:   Tongue normal, posterior pharynx without erythema or exudate.  Mallampati Classification: II+  Neck:        Supple, trachea midline, no masses.  Respiratory Effort: No intercostal retractions or use of accessory muscles.   Gait and Station: Normal.  Digits and Nails: No clubbing, cyanosis, petechiae, or nodes.   Skin:        No rashes, lesions or ulcers noted.               Ext:           No cyanosis  or edema.      Immunizations:  Flu: 12/4/2024  PCV 20: Recommended  Moderna booster SARS-CoV-2 vaccine: 11/22/2022  Moderna SARS CoV2 Vaccine: 12/17/2021, 4/17/2021, 3/20/2021  COVID-19 mRNA vaccine: 10/1/2023    Assessment / Plan:  1. WILFREDO (obstructive sleep apnea)  - DME CPAP    Reviewed compliance which is excellent, demonstrating use and benefit.  However patient device is not working well and is greater than 5 years old, will need replaced.  [facility] order for new CPAP to [facility].  Minimum usage in the first 90 days for new device is 4 hours/day for insurance purposes, understands she will need a short-term follow-up within first 90 days of having device with at least 30 days of usage data to review.  She understands she will need to bring entire device to clinic for that first follow-up visit.  Reviewed equipment cleaning as well as equipment replacement schedule and reminded patient to use distilled water only in humidifier chamber.      Follow-up:   Return in about 4 months (around 5/16/2025) for Return with Nighat Thayer PA-C.    Please note that this dictation was created using voice recognition software. I have made every reasonable attempt to correct obvious errors, but it is possible there are errors of grammar and possibly content that I did not discover before finalizing the note.

## 2025-01-17 NOTE — PATIENT INSTRUCTIONS
1-reviewed compliance which is excellent  2-demonstrating use and benefit  3-however, device is not working well, >5 years old needs replacement   4-send order for new cpap to [facility]   5-minimum usage in the first 90 for a new device is 4 hours per day for insurance   6-short term follow up within first 90 days of having device with 30 days of usage  7-As a reminder use distilled water only in humidifier chamber.  Fresh fill daily   8-Today we reviewed equipment cleaning  once weekly minimum  mask, tubing and water chamber  use dedicated container  use mild soap and water  SoClean or other ozone  are not recommended  white vinegar and water solution is no longer recommended  hang tubing to dry  mask sanitizing wipes are an option for use   9-follow up in the first 90 days of having device   10- will schedule for 4 months adjust if needed  11- will need at least 30 days of usage to review

## 2025-03-21 ENCOUNTER — HOSPITAL ENCOUNTER (OUTPATIENT)
Dept: LAB | Facility: MEDICAL CENTER | Age: 55
End: 2025-03-21
Attending: INTERNAL MEDICINE
Payer: COMMERCIAL

## 2025-03-21 LAB
25(OH)D3 SERPL-MCNC: 82 NG/ML (ref 30–100)
ALBUMIN SERPL BCP-MCNC: 4.2 G/DL (ref 3.2–4.9)
ALBUMIN/GLOB SERPL: 1.4 G/DL
ALP SERPL-CCNC: 84 U/L (ref 30–99)
ALT SERPL-CCNC: 21 U/L (ref 2–50)
ANION GAP SERPL CALC-SCNC: 11 MMOL/L (ref 7–16)
AST SERPL-CCNC: 23 U/L (ref 12–45)
BASOPHILS # BLD AUTO: 0.7 % (ref 0–1.8)
BASOPHILS # BLD: 0.07 K/UL (ref 0–0.12)
BILIRUB SERPL-MCNC: 0.4 MG/DL (ref 0.1–1.5)
BUN SERPL-MCNC: 13 MG/DL (ref 8–22)
CALCIUM ALBUM COR SERPL-MCNC: 9.2 MG/DL (ref 8.5–10.5)
CALCIUM SERPL-MCNC: 9.4 MG/DL (ref 8.5–10.5)
CHLORIDE SERPL-SCNC: 105 MMOL/L (ref 96–112)
CHOLEST SERPL-MCNC: 197 MG/DL (ref 100–199)
CO2 SERPL-SCNC: 22 MMOL/L (ref 20–33)
CREAT SERPL-MCNC: 0.74 MG/DL (ref 0.5–1.4)
EOSINOPHIL # BLD AUTO: 0.1 K/UL (ref 0–0.51)
EOSINOPHIL NFR BLD: 1.1 % (ref 0–6.9)
ERYTHROCYTE [DISTWIDTH] IN BLOOD BY AUTOMATED COUNT: 45.3 FL (ref 35.9–50)
FASTING STATUS PATIENT QL REPORTED: NORMAL
GFR SERPLBLD CREATININE-BSD FMLA CKD-EPI: 97 ML/MIN/1.73 M 2
GLOBULIN SER CALC-MCNC: 3.1 G/DL (ref 1.9–3.5)
GLUCOSE SERPL-MCNC: 96 MG/DL (ref 65–99)
HCT VFR BLD AUTO: 45.2 % (ref 37–47)
HDLC SERPL-MCNC: 48 MG/DL
HGB BLD-MCNC: 14.8 G/DL (ref 12–16)
IMM GRANULOCYTES # BLD AUTO: 0.03 K/UL (ref 0–0.11)
IMM GRANULOCYTES NFR BLD AUTO: 0.3 % (ref 0–0.9)
LDLC SERPL CALC-MCNC: 130 MG/DL
LYMPHOCYTES # BLD AUTO: 1.7 K/UL (ref 1–4.8)
LYMPHOCYTES NFR BLD: 18.1 % (ref 22–41)
MCH RBC QN AUTO: 29.9 PG (ref 27–33)
MCHC RBC AUTO-ENTMCNC: 32.7 G/DL (ref 32.2–35.5)
MCV RBC AUTO: 91.3 FL (ref 81.4–97.8)
MONOCYTES # BLD AUTO: 0.73 K/UL (ref 0–0.85)
MONOCYTES NFR BLD AUTO: 7.8 % (ref 0–13.4)
NEUTROPHILS # BLD AUTO: 6.77 K/UL (ref 1.82–7.42)
NEUTROPHILS NFR BLD: 72 % (ref 44–72)
NRBC # BLD AUTO: 0 K/UL
NRBC BLD-RTO: 0 /100 WBC (ref 0–0.2)
PLATELET # BLD AUTO: 246 K/UL (ref 164–446)
PMV BLD AUTO: 11.9 FL (ref 9–12.9)
POTASSIUM SERPL-SCNC: 4.5 MMOL/L (ref 3.6–5.5)
PROT SERPL-MCNC: 7.3 G/DL (ref 6–8.2)
RBC # BLD AUTO: 4.95 M/UL (ref 4.2–5.4)
SODIUM SERPL-SCNC: 138 MMOL/L (ref 135–145)
TRIGL SERPL-MCNC: 97 MG/DL (ref 0–149)
VIT B12 SERPL-MCNC: 735 PG/ML (ref 211–911)
WBC # BLD AUTO: 9.4 K/UL (ref 4.8–10.8)

## 2025-03-21 PROCEDURE — 82306 VITAMIN D 25 HYDROXY: CPT

## 2025-03-21 PROCEDURE — 36415 COLL VENOUS BLD VENIPUNCTURE: CPT

## 2025-03-21 PROCEDURE — 80053 COMPREHEN METABOLIC PANEL: CPT

## 2025-03-21 PROCEDURE — 86480 TB TEST CELL IMMUN MEASURE: CPT

## 2025-03-21 PROCEDURE — 85025 COMPLETE CBC W/AUTO DIFF WBC: CPT

## 2025-03-21 PROCEDURE — 82607 VITAMIN B-12: CPT

## 2025-03-21 PROCEDURE — 80061 LIPID PANEL: CPT

## 2025-03-23 LAB
GAMMA INTERFERON BACKGROUND BLD IA-ACNC: 0.04 IU/ML
M TB IFN-G BLD-IMP: NEGATIVE
M TB IFN-G CD4+ BCKGRND COR BLD-ACNC: 0 IU/ML
MITOGEN IGNF BCKGRD COR BLD-ACNC: 2.28 IU/ML
QFT TB2 - NIL TBQ2: 0 IU/ML

## 2025-05-10 ENCOUNTER — HOSPITAL ENCOUNTER (OUTPATIENT)
Dept: LAB | Facility: MEDICAL CENTER | Age: 55
End: 2025-05-10
Attending: INTERNAL MEDICINE
Payer: COMMERCIAL

## 2025-05-10 LAB
EST. AVERAGE GLUCOSE BLD GHB EST-MCNC: 114 MG/DL
HBA1C MFR BLD: 5.6 % (ref 4–5.6)

## 2025-05-10 PROCEDURE — 83036 HEMOGLOBIN GLYCOSYLATED A1C: CPT

## 2025-05-10 PROCEDURE — 36415 COLL VENOUS BLD VENIPUNCTURE: CPT

## 2025-05-14 ENCOUNTER — OFFICE VISIT (OUTPATIENT)
Dept: SLEEP MEDICINE | Facility: MEDICAL CENTER | Age: 55
End: 2025-05-14
Attending: PHYSICIAN ASSISTANT
Payer: COMMERCIAL

## 2025-05-14 VITALS
BODY MASS INDEX: 27.38 KG/M2 | DIASTOLIC BLOOD PRESSURE: 72 MMHG | WEIGHT: 145 LBS | HEART RATE: 96 BPM | HEIGHT: 61 IN | RESPIRATION RATE: 16 BRPM | SYSTOLIC BLOOD PRESSURE: 112 MMHG | OXYGEN SATURATION: 97 %

## 2025-05-14 DIAGNOSIS — G47.33 OSA (OBSTRUCTIVE SLEEP APNEA): Primary | ICD-10-CM

## 2025-05-14 PROCEDURE — 99214 OFFICE O/P EST MOD 30 MIN: CPT | Performed by: PHYSICIAN ASSISTANT

## 2025-05-14 PROCEDURE — 99213 OFFICE O/P EST LOW 20 MIN: CPT | Performed by: PHYSICIAN ASSISTANT

## 2025-05-14 PROCEDURE — 3078F DIAST BP <80 MM HG: CPT | Performed by: PHYSICIAN ASSISTANT

## 2025-05-14 PROCEDURE — 3074F SYST BP LT 130 MM HG: CPT | Performed by: PHYSICIAN ASSISTANT

## 2025-05-14 ASSESSMENT — ENCOUNTER SYMPTOMS
DIZZINESS: 0
FEVER: 0
ROS GI COMMENTS: NO DENTURES, NO MISSING TEETH OR SWALLOWING ISSUES
PALPITATIONS: 0
INSOMNIA: 0
SHORTNESS OF BREATH: 0
TREMORS: 0
WHEEZING: 0
HEADACHES: 1
CHILLS: 0
ORTHOPNEA: 0
SINUS PAIN: 0
COUGH: 1
SORE THROAT: 0
HEARTBURN: 0
WEIGHT LOSS: 0
SPUTUM PRODUCTION: 1

## 2025-05-14 ASSESSMENT — FIBROSIS 4 INDEX: FIB4 SCORE: 1.06

## 2025-05-14 NOTE — PROGRESS NOTES
"Chief Complaint   Patient presents with    Apnea     Last Office Visit 1/16/25 with Nighat Thayer P.A.-C.    1st compliance    PAP/O2/OAT: Auto CPAP cmH20 11-15    Set up: 2/20/25       HPI:  Mirhta Han is a 54 y.o. year old female here today for follow-up on obstructive sleep apnea and first compliance on new device.  Last seen in clinic 1/16/2025.  Previously evaluated 12/20/2018 by Dr. Bhupinder Jarquin.    Past Medical History: WILFREDO on CPAP, deviated nasal septum S/P septoplasty and turbinoplasty, chronic eye dryness, migraines, UPPP surgery.  Patient was previously evaluated for oral mandibular device but felt not to be the best candidate and was urged to resume use of CPAP therapy.       Vitals:  /72 (BP Location: Left arm, Patient Position: Sitting, BP Cuff Size: Adult)   Pulse 96   Resp 16   Ht 1.549 m (5' 1\")   Wt 65.8 kg (145 lb)   SpO2 97% BMI of 27.4 kg/m²    Recent Imaging: None    Currently using  [employer] auto CPAP @11-15 cm H20 pressure; compliance reviewed for 4/13/2025 through 5/12/2025, days used 30/30, average daily usage 6 hours 12 minutes, 93% of days greater than or equal to 4 hours, mask leak at 25 LPM at 95th percentile, AHI 1.3 per hour.  See media for full report.    Device obtained 2/20/2025  DME provider [facility]  Mask interface nasal cushion    Polysomnogram split-night study obtained in February 2014 with findings consistent with severe obstructive sleep apnea with overall AHI of 35.2 events per hour and low O2 sat of 83%. Patient tolerated CPAP of 7 cm H2O pressure best.      Sleep schedule goes to bed 9 PM, wakens 5 AM , and gets up during the night bathroom x 0-1   Symptoms denies day time somnolence and denies morning headache with device use    [place] Sleepiness Scale reported as 12/24 on 9/12/2018      Review of Systems   Constitutional:  Negative for chills, fever, malaise/fatigue and weight loss.   HENT:  Negative for congestion, hearing loss, nosebleeds, sinus " pain, sore throat and tinnitus.    Eyes:         Presc glasses    Respiratory:  Positive for cough and sputum production (clear). Negative for shortness of breath and wheezing.    Cardiovascular:  Negative for chest pain, palpitations, orthopnea and leg swelling.   Gastrointestinal:  Negative for heartburn.        No dentures, no missing teeth or swallowing issues   Neurological:  Positive for headaches (migraines). Negative for dizziness and tremors.   Psychiatric/Behavioral:  The patient does not have insomnia.        Past Medical History[1]    Past Surgical History[2]    Family History   Problem Relation Age of Onset    Diabetes Father     Hypertension Mother     Sleep Apnea Neg Hx        Social History     Socioeconomic History    Marital status:      Spouse name: Not on file    Number of children: Not on file    Years of education: Not on file    Highest education level: Not on file   Occupational History    Occupation: office work     Employer: [employer]   Tobacco Use    Smoking status: Never    Smokeless tobacco: Never   Vaping Use    Vaping status: Never Used   Substance and Sexual Activity    Alcohol use: Yes     Comment: 4 per month    Drug use: No    Sexual activity: Yes     Birth control/protection: Pill   Other Topics Concern    Not on file   Social History Narrative    Not on file     Social Drivers of Health     Financial Resource Strain: Not on file   Food Insecurity: Not on file   Transportation Needs: Not on file   Physical Activity: Not on file   Stress: Not on file   Social Connections: Not on file   Intimate Partner Violence: Not on file   Housing Stability: Not on file       Allergies as of 05/14/2025 - Reviewed 05/14/2025   Allergen Reaction Noted    Nkda [no known drug allergy]  06/03/2010          Current medications as of today Current Medications[3]      Physical Exam:   Gen:           Alert and oriented, No apparent distress. Mood and affect appropriate, normal interaction with  examiner.   Hearing:     Grossly intact.  Nose:          Normal, no lesions or deformities.  Dentition:    Good dentition.   Oropharynx:   Tongue normal, posterior pharynx without erythema or exudate.  Mallampati Classification: II, no uvula  Neck:        Supple, trachea midline, no masses.  Respiratory Effort: No intercostal retractions or use of accessory muscles.   Gait and Station: Normal.  Digits and Nails: No clubbing, cyanosis, petechiae, or nodes.   Skin:        No rashes, lesions or ulcers noted.               Ext:           No cyanosis or edema.      Immunizations:  Flu: 12/4/2024  PCV 20: Recommended  SARS CoV2 Vaccine: 10/1/2023, 11/22/2022, 1217 12/17/2021, 4/17/2021 3/20/2021    Assessment / Plan:  1. WILFREDO (obstructive sleep apnea)  - DME Mask and Supplies    Patient received new device, met all first compliance requirements.  Patient demonstrating use and benefit.  Mild mask leak noted consider mouth tape or chinstrap.  Updated order for mask and supplies will be sent to [facility].  Patient was also provided with a sample nasal cushion AirTouch to trial.  She was reminded to use distilled water only in humidifier chamber with fresh fill daily.  Reviewed equipment cleaning as well as equipment replacement scheduled.  Patient to follow-up in 1 year or sooner if needed.      Follow-up:   Return in about 1 year (around 5/14/2026) for Return with Nighat Thayer PA-C.    Please note that this dictation was created using voice recognition software. I have made every reasonable attempt to correct obvious errors, but it is possible there are errors of grammar and possibly content that I did not discover before finalizing the note.         [1]   Past Medical History:  Diagnosis Date    Chickenpox     Migraine headache     Pap smear     Dr. Gemini Olmstead    Sleep apnea     CPAP use    Snoring     Stable burst fracture of first lumbar vertebra (HCC) 5/01   [2]   Past Surgical History:  Procedure Laterality Date     SEPTOPLASTY N/A 3/21/2016    Procedure: SEPTOPLASTY;  Surgeon: DELMA Rodriguez M.D.;  Location: SURGERY SAME DAY [facility];  Service:     TONSILLECTOMY N/A 3/21/2016    Procedure: TONSILLECTOMY;  Surgeon: DELMA Rodriguez M.D.;  Location: SURGERY SAME DAY [facility];  Service:     SEPTAL RECONSTRUCTION N/A 3/21/2016    Procedure: SEPTAL RECONSTRUCTION with  grafts ;  Surgeon: DELMA Rodriguez M.D.;  Location: SURGERY SAME DAY [facility];  Service:     TURBINOPLASTY N/A 3/21/2016    Procedure: TURBINOPLASTY;  Surgeon: DELMA Rodriguez M.D.;  Location: SURGERY SAME DAY [facility];  Service:     TUBAL COAGULATION LAPAROSCOPIC BILATERAL  6/9/2010    Performed by AGAPITO SUÁREZ at SURGERY SAME DAY [facility]    OTHER ORTHOPEDIC SURGERY  1989    right foot surgery - bone removed   [3]   Current Outpatient Medications   Medication Sig Dispense Refill    Cholecalciferol (VITAMIN D PO) Take  by mouth.      topiramate (TOPAMAX) 25 MG Tab Take 25 mg by mouth as needed.      PROPRANOLOL HCL 2 x day for migraines       No current facility-administered medications for this visit.

## 2025-05-14 NOTE — PATIENT INSTRUCTIONS
1-received new device  2-met all first compliance requirements  3-demonstrating use and benefit  4-mild mask leak consider mouth tape or chin strap  5-updated order for mask and supplies to [facility]   6-sample nasal cushion airtouch to trial   7-As a reminder use distilled water only in humidifier chamber.  Fresh fill daily   8-Today we reviewed equipment cleaning  once weekly minimum  mask, tubing and water chamber  use dedicated container  use mild soap and water  SoClean or other ozone  are not recommended  white vinegar and water solution is no longer recommended  hang tubing to dry  mask sanitizing wipes are an option for use   9-Equipment replacement schedule : Nasal pillows 2 times per month, Head gear every 6 months, Tubing every 3 months, Ultra-fine filters 2 times per month, Humidifier chamber every 6 months

## 2025-05-16 ENCOUNTER — APPOINTMENT (OUTPATIENT)
Dept: SLEEP MEDICINE | Facility: MEDICAL CENTER | Age: 55
End: 2025-05-16
Attending: PHYSICIAN ASSISTANT
Payer: COMMERCIAL

## 2025-08-04 ENCOUNTER — HOSPITAL ENCOUNTER (OUTPATIENT)
Dept: LAB | Facility: MEDICAL CENTER | Age: 55
End: 2025-08-04
Attending: INTERNAL MEDICINE
Payer: COMMERCIAL

## 2025-08-04 LAB
ALBUMIN SERPL BCP-MCNC: 4.3 G/DL (ref 3.2–4.9)
ALBUMIN/GLOB SERPL: 1.6 G/DL
ALP SERPL-CCNC: 102 U/L (ref 30–99)
ALT SERPL-CCNC: 22 U/L (ref 2–50)
ANION GAP SERPL CALC-SCNC: 9 MMOL/L (ref 7–16)
AST SERPL-CCNC: 23 U/L (ref 12–45)
BILIRUB SERPL-MCNC: 0.3 MG/DL (ref 0.1–1.5)
BUN SERPL-MCNC: 9 MG/DL (ref 8–22)
CALCIUM ALBUM COR SERPL-MCNC: 8.4 MG/DL (ref 8.5–10.5)
CALCIUM SERPL-MCNC: 8.6 MG/DL (ref 8.5–10.5)
CHLORIDE SERPL-SCNC: 103 MMOL/L (ref 96–112)
CO2 SERPL-SCNC: 24 MMOL/L (ref 20–33)
CREAT SERPL-MCNC: 0.65 MG/DL (ref 0.5–1.4)
GFR SERPLBLD CREATININE-BSD FMLA CKD-EPI: 105 ML/MIN/1.73 M 2
GLOBULIN SER CALC-MCNC: 2.7 G/DL (ref 1.9–3.5)
GLUCOSE SERPL-MCNC: 92 MG/DL (ref 65–99)
POTASSIUM SERPL-SCNC: 4.1 MMOL/L (ref 3.6–5.5)
PROT SERPL-MCNC: 7 G/DL (ref 6–8.2)
SODIUM SERPL-SCNC: 136 MMOL/L (ref 135–145)
T4 FREE SERPL-MCNC: 0.99 NG/DL (ref 0.93–1.7)
TSH SERPL-ACNC: 1.59 UIU/ML (ref 0.38–5.33)

## 2025-08-04 PROCEDURE — 36415 COLL VENOUS BLD VENIPUNCTURE: CPT

## 2025-08-04 PROCEDURE — 84443 ASSAY THYROID STIM HORMONE: CPT

## 2025-08-04 PROCEDURE — 84439 ASSAY OF FREE THYROXINE: CPT

## 2025-08-04 PROCEDURE — 80053 COMPREHEN METABOLIC PANEL: CPT

## 2025-08-06 ENCOUNTER — HOSPITAL ENCOUNTER (OUTPATIENT)
Facility: MEDICAL CENTER | Age: 55
End: 2025-08-06
Attending: INTERNAL MEDICINE
Payer: COMMERCIAL

## 2025-08-06 LAB
CREAT UR-MCNC: 101 MG/DL
MICROALBUMIN UR-MCNC: <1.2 MG/DL
MICROALBUMIN/CREAT UR: NORMAL MG/G (ref 0–30)

## 2025-08-06 PROCEDURE — 82043 UR ALBUMIN QUANTITATIVE: CPT

## 2025-08-06 PROCEDURE — 82570 ASSAY OF URINE CREATININE: CPT

## 2025-08-18 ENCOUNTER — APPOINTMENT (OUTPATIENT)
Dept: RADIOLOGY | Facility: MEDICAL CENTER | Age: 55
End: 2025-08-18
Attending: INTERNAL MEDICINE
Payer: COMMERCIAL

## 2025-08-18 DIAGNOSIS — Z12.31 VISIT FOR SCREENING MAMMOGRAM: ICD-10-CM

## 2025-08-20 ENCOUNTER — HOSPITAL ENCOUNTER (OUTPATIENT)
Dept: RADIOLOGY | Facility: MEDICAL CENTER | Age: 55
End: 2025-08-20
Attending: OBSTETRICS & GYNECOLOGY
Payer: COMMERCIAL

## 2025-08-20 VITALS — BODY MASS INDEX: 27.38 KG/M2 | WEIGHT: 145 LBS | HEIGHT: 61 IN

## 2025-08-20 DIAGNOSIS — Z12.31 ENCOUNTER FOR SCREENING MAMMOGRAM FOR BREAST CANCER: ICD-10-CM

## 2025-08-20 PROCEDURE — 77067 SCR MAMMO BI INCL CAD: CPT

## 2025-08-20 ASSESSMENT — FIBROSIS 4 INDEX: FIB4 SCORE: 1.04
